# Patient Record
Sex: FEMALE | Employment: FULL TIME | URBAN - METROPOLITAN AREA
[De-identification: names, ages, dates, MRNs, and addresses within clinical notes are randomized per-mention and may not be internally consistent; named-entity substitution may affect disease eponyms.]

---

## 2023-12-14 ENCOUNTER — OCCMED (OUTPATIENT)
Dept: URGENT CARE | Facility: CLINIC | Age: 48
End: 2023-12-14

## 2023-12-14 ENCOUNTER — APPOINTMENT (OUTPATIENT)
Dept: LAB | Facility: CLINIC | Age: 48
End: 2023-12-14

## 2023-12-14 DIAGNOSIS — Z02.1 PRE-EMPLOYMENT EXAMINATION: Primary | ICD-10-CM

## 2023-12-14 DIAGNOSIS — Z02.1 PRE-EMPLOYMENT EXAMINATION: ICD-10-CM

## 2023-12-14 LAB
MEV IGG SER QL IA: NORMAL
MUV IGG SER QL IA: NORMAL
RUBV IGG SERPL IA-ACNC: 118.8 IU/ML
VZV IGG SER QL IA: ABNORMAL

## 2023-12-14 PROCEDURE — 86765 RUBEOLA ANTIBODY: CPT

## 2023-12-14 PROCEDURE — 86762 RUBELLA ANTIBODY: CPT

## 2023-12-14 PROCEDURE — 86787 VARICELLA-ZOSTER ANTIBODY: CPT

## 2023-12-14 PROCEDURE — 86480 TB TEST CELL IMMUN MEASURE: CPT

## 2023-12-14 PROCEDURE — 36415 COLL VENOUS BLD VENIPUNCTURE: CPT

## 2023-12-14 PROCEDURE — 86735 MUMPS ANTIBODY: CPT

## 2023-12-15 LAB
GAMMA INTERFERON BACKGROUND BLD IA-ACNC: 0.14 IU/ML
M TB IFN-G BLD-IMP: NEGATIVE
M TB IFN-G CD4+ BCKGRND COR BLD-ACNC: 0.04 IU/ML
M TB IFN-G CD4+ BCKGRND COR BLD-ACNC: 0.04 IU/ML
MITOGEN IGNF BCKGRD COR BLD-ACNC: 9.86 IU/ML

## 2024-01-17 ENCOUNTER — OFFICE VISIT (OUTPATIENT)
Dept: URGENT CARE | Facility: MEDICAL CENTER | Age: 49
End: 2024-01-17

## 2024-01-17 VITALS
TEMPERATURE: 98.6 F | RESPIRATION RATE: 18 BRPM | HEART RATE: 74 BPM | OXYGEN SATURATION: 98 % | SYSTOLIC BLOOD PRESSURE: 124 MMHG | DIASTOLIC BLOOD PRESSURE: 70 MMHG

## 2024-01-17 DIAGNOSIS — R68.89 FLU-LIKE SYMPTOMS: Primary | ICD-10-CM

## 2024-01-17 LAB
SARS-COV-2 AG UPPER RESP QL IA: NEGATIVE
VALID CONTROL: NORMAL

## 2024-01-17 PROCEDURE — 99213 OFFICE O/P EST LOW 20 MIN: CPT | Performed by: NURSE PRACTITIONER

## 2024-01-17 PROCEDURE — 87811 SARS-COV-2 COVID19 W/OPTIC: CPT | Performed by: NURSE PRACTITIONER

## 2024-01-17 RX ORDER — NAPROXEN 500 MG/1
1 TABLET ORAL 2 TIMES DAILY
COMMUNITY
Start: 2013-03-08

## 2024-01-17 RX ORDER — TRIAMCINOLONE ACETONIDE 1 MG/G
1 OINTMENT TOPICAL 2 TIMES DAILY
COMMUNITY
Start: 2023-09-12 | End: 2024-09-11

## 2024-01-17 RX ORDER — LEVOCETIRIZINE DIHYDROCHLORIDE 5 MG/1
5 TABLET, FILM COATED ORAL
COMMUNITY
Start: 2023-09-13 | End: 2024-09-12

## 2024-01-17 RX ORDER — RIZATRIPTAN BENZOATE 10 MG/1
10 TABLET, ORALLY DISINTEGRATING ORAL
COMMUNITY
Start: 2023-06-08 | End: 2024-06-07

## 2024-01-17 RX ORDER — ERGOCALCIFEROL 1.25 MG/1
1 CAPSULE ORAL WEEKLY
COMMUNITY
Start: 2013-03-19

## 2024-01-17 RX ORDER — MECLIZINE HYDROCHLORIDE 25 MG/1
TABLET ORAL
COMMUNITY
Start: 2013-05-22

## 2024-01-17 RX ORDER — IBUPROFEN 200 MG
200 TABLET ORAL EVERY 6 HOURS PRN
COMMUNITY

## 2024-01-17 RX ORDER — LORATADINE 10 MG/1
1 TABLET ORAL DAILY
COMMUNITY
Start: 2013-05-22

## 2024-01-17 RX ORDER — AMITRIPTYLINE HYDROCHLORIDE 25 MG/1
TABLET, FILM COATED ORAL
COMMUNITY
Start: 2013-03-08

## 2024-01-17 RX ORDER — FLUTICASONE PROPIONATE 50 MCG
2 SPRAY, SUSPENSION (ML) NASAL DAILY
COMMUNITY
Start: 2023-09-13

## 2024-01-17 NOTE — PROGRESS NOTES
NAME: Bella Almaguer is a 48 y.o. female  : 1975    MRN: [id number]    /70   Pulse 74   Temp 98.6 °F (37 °C)   Resp 18   SpO2 98%     11:52 AM    Assessment and Plan   Flu-like symptoms [R68.89]  1. Flu-like symptoms  Poct Covid 19 Rapid Antigen Test          Bella was seen today for fever.    Diagnoses and all orders for this visit:    Flu-like symptoms  -     Poct Covid 19 Rapid Antigen Test        Patient Instructions     Patient Instructions   Take zyrtec or allegra daily  Use flonase 1-2 sprays in each nare daily   Use nasal saline to the nose,   Use humidifer in room  Symptoms worsen go to ER  Rest    Rapid covid test today in the office was done,   Results will be on [employer] nisha.   Check you [employer] for results.     Rest       Proceed to the nearest ER if symptoms worsen, Follow up with your PCP  Continue to social distance, wash your hands, and wear your masks. Please continue to follow the CDC.gov guidelines daily for they are subject to change on COVID-19    Chief Complaint     Chief Complaint   Patient presents with    Fever     Patient c/o fever, body aches, and chills x 5-6 days          History of Present Illness     48-year-old female here today with flulike symptoms have been ongoing for the past 6 days.  She has had headache fever body aches since Thursday and feels that the worst is over.  Her symptoms have been improving has no fever today however had 1 yesterday.  Came in today for because she needs a work note.  She has been taking over-the-counter medications with relief and did not take a COVID test since symptoms started.    Fever  Associated symptoms include chills, congestion, coughing, fatigue, a fever, headaches, myalgias and a sore throat. Pertinent negatives include no chest pain, nausea or vomiting.           Review of Systems   Review of Systems   Constitutional:  Positive for chills, fatigue and fever. Negative for activity change and appetite change.    HENT:  Positive for congestion, ear pain, postnasal drip, rhinorrhea and sore throat. Negative for sinus pressure, sinus pain and sneezing.    Eyes:  Negative for pain.   Respiratory:  Positive for cough. Negative for chest tightness, shortness of breath, wheezing and stridor.    Cardiovascular:  Negative for chest pain.   Gastrointestinal: Negative.  Negative for diarrhea, nausea and vomiting.   Genitourinary: Negative.    Musculoskeletal:  Positive for myalgias.   Skin: Negative.    Neurological:  Positive for headaches.   All other systems reviewed and are negative.        Current Medications       Current Outpatient Medications:     amitriptyline (ELAVIL) 25 mg tablet, Take by mouth, Disp: , Rfl:     fluticasone (FLONASE) 50 mcg/act nasal spray, 2 sprays into each nostril daily, Disp: , Rfl:     levocetirizine (XYZAL) 5 MG tablet, Take 5 mg by mouth, Disp: , Rfl:     loratadine (CLARITIN) 10 mg tablet, Take 1 tablet by mouth daily, Disp: , Rfl:     meclizine (ANTIVERT) 25 mg tablet, Take by mouth, Disp: , Rfl:     naproxen (NAPROSYN) 500 mg tablet, Take 1 tablet by mouth Twice daily, Disp: , Rfl:     rizatriptan (MAXALT-MLT) 10 mg disintegrating tablet, Take 10 mg by mouth, Disp: , Rfl:     triamcinolone (KENALOG) 0.1 % ointment, Apply 1 application. topically 2 (two) times a day, Disp: , Rfl:     Vitamin D, Ergocalciferol, 77193 units CAPS, Take 1 capsule by mouth once a week, Disp: , Rfl:     ACETAMINOPHEN 8 HOUR PO, Take 500 mg by mouth every 6 (six) hours as needed, Disp: , Rfl:     Cholecalciferol 50 MCG (2000 UT) CAPS, Take 1 capsule by mouth every morning, Disp: , Rfl:     ibuprofen (MOTRIN) 200 mg tablet, Take 200 mg by mouth every 6 (six) hours as needed, Disp: , Rfl:     Current Allergies     Allergies as of 01/17/2024    (No Known Allergies)              No past medical history on file.    No past surgical history on file.    No family history on file.      Medications have been verified.    The  "following portions of the patient's history were reviewed and updated as appropriate: allergies, current medications, past family history, past medical history, past social history, past surgical history and problem list.    Objective   /70   Pulse 74   Temp 98.6 °F (37 °C)   Resp 18   SpO2 98%      Physical Exam     Physical Exam  Constitutional:       General: She is not in acute distress.     Appearance: She is well-developed.   HENT:      Head: Normocephalic.      Right Ear: Hearing, tympanic membrane, ear canal and external ear normal. There is no impacted cerumen.      Left Ear: Hearing, tympanic membrane, ear canal and external ear normal. There is no impacted cerumen.      Nose: Mucosal edema present. No congestion or rhinorrhea.      Mouth/Throat:      Mouth: Mucous membranes are moist.      Pharynx: Uvula midline. No oropharyngeal exudate or posterior oropharyngeal erythema.      Tonsils: No tonsillar exudate.   Cardiovascular:      Rate and Rhythm: Normal rate and regular rhythm.      Pulses: Normal pulses.      Heart sounds: Normal heart sounds. No murmur heard.  Pulmonary:      Effort: Pulmonary effort is normal. No respiratory distress.      Breath sounds: Normal breath sounds. No stridor. No decreased breath sounds, wheezing or rhonchi.   Musculoskeletal:         General: No swelling, tenderness, deformity or signs of injury. Normal range of motion.      Cervical back: Normal range of motion. No spinous process tenderness or muscular tenderness. Normal range of motion.      Comments: Full ROM of all extremities and \"soreness\" reported all over   Lymphadenopathy:      Cervical: No cervical adenopathy.   Skin:     General: Skin is warm.      Capillary Refill: Capillary refill takes less than 2 seconds.   Neurological:      Mental Status: She is alert and oriented to person, place, and time.   Psychiatric:         Behavior: Behavior is cooperative.               Note: Portions of this record " "may have been created with voice recognition software. Occasional wrong word or \"sound a like\" substitutions may have occurred due to the inherent limitations of voice recognition software. Please read the chart carefully and recognize, using context, where substitutions have occurred.*    JORGE LUIS Gill  "

## 2024-01-17 NOTE — LETTER
January 17, 2024     Patient: Bella Almaguer  YOB: 1975  Date of Visit: 1/17/2024      To Whom it May Concern:    Bella Almaguer is under my professional care. Bella was seen in my office on 1/17/2024. Bella may return to work on 01/18/2024 as long as fever free. If not please excuse till your next working day.        Sincerely,          JORGE LUIS Gill        CC: No Recipients

## 2024-01-17 NOTE — PATIENT INSTRUCTIONS
Take zyrtec or allegra daily  Use flonase 1-2 sprays in each nare daily   Use nasal saline to the nose,   Use humidifer in room  Symptoms worsen go to ER  Rest    Rapid covid test today in the office was done,   Results will be on [employer] nisha.   Check you [employer] for results.     Rest

## 2024-02-22 ENCOUNTER — OFFICE VISIT (OUTPATIENT)
Dept: FAMILY MEDICINE CLINIC | Facility: CLINIC | Age: 49
End: 2024-02-22
Payer: COMMERCIAL

## 2024-02-22 VITALS
TEMPERATURE: 97 F | BODY MASS INDEX: 31.54 KG/M2 | DIASTOLIC BLOOD PRESSURE: 72 MMHG | OXYGEN SATURATION: 99 % | HEIGHT: 63 IN | SYSTOLIC BLOOD PRESSURE: 118 MMHG | HEART RATE: 82 BPM | WEIGHT: 178 LBS

## 2024-02-22 DIAGNOSIS — G47.30 SLEEP APNEA, UNSPECIFIED TYPE: ICD-10-CM

## 2024-02-22 DIAGNOSIS — Z13.21 ENCOUNTER FOR VITAMIN DEFICIENCY SCREENING: ICD-10-CM

## 2024-02-22 DIAGNOSIS — Z01.419 VISIT FOR GYNECOLOGIC EXAMINATION: ICD-10-CM

## 2024-02-22 DIAGNOSIS — Z12.11 COLON CANCER SCREENING: ICD-10-CM

## 2024-02-22 DIAGNOSIS — M05.79 RHEUMATOID ARTHRITIS INVOLVING MULTIPLE SITES WITH POSITIVE RHEUMATOID FACTOR (HCC): ICD-10-CM

## 2024-02-22 DIAGNOSIS — Z13.220 LIPID SCREENING: ICD-10-CM

## 2024-02-22 DIAGNOSIS — Z13.29 THYROID DISORDER SCREENING: ICD-10-CM

## 2024-02-22 DIAGNOSIS — K20.0 EOSINOPHILIC ESOPHAGITIS: ICD-10-CM

## 2024-02-22 DIAGNOSIS — Z76.89 ENCOUNTER TO ESTABLISH CARE WITH NEW DOCTOR: Primary | ICD-10-CM

## 2024-02-22 DIAGNOSIS — Z13.89 SCREENING FOR GENITOURINARY CONDITION: ICD-10-CM

## 2024-02-22 DIAGNOSIS — Z12.11 SCREENING FOR COLON CANCER: Primary | ICD-10-CM

## 2024-02-22 DIAGNOSIS — Z12.4 CERVICAL CANCER SCREENING: ICD-10-CM

## 2024-02-22 DIAGNOSIS — R10.13 DYSPEPSIA: ICD-10-CM

## 2024-02-22 PROBLEM — H16.223 KERATOCONJUNCTIVITIS SICCA OF BOTH EYES NOT SPECIFIED AS SJOGREN'S: Status: ACTIVE | Noted: 2022-09-01

## 2024-02-22 PROBLEM — G47.33 MODERATE OBSTRUCTIVE SLEEP APNEA: Status: ACTIVE | Noted: 2017-11-09

## 2024-02-22 PROBLEM — F32.1 MODERATE SINGLE CURRENT EPISODE OF MAJOR DEPRESSIVE DISORDER (HCC): Chronic | Status: ACTIVE | Noted: 2018-08-03

## 2024-02-22 PROBLEM — J45.20 MILD INTERMITTENT ASTHMA WITHOUT COMPLICATION: Status: ACTIVE | Noted: 2024-02-22

## 2024-02-22 PROCEDURE — 99205 OFFICE O/P NEW HI 60 MIN: CPT | Performed by: INTERNAL MEDICINE

## 2024-02-22 RX ORDER — CYCLOSPORINE 0.5 MG/ML
1 EMULSION OPHTHALMIC 2 TIMES DAILY
COMMUNITY

## 2024-02-22 RX ORDER — ALBUTEROL SULFATE 90 UG/1
2 AEROSOL, METERED RESPIRATORY (INHALATION) EVERY 6 HOURS PRN
COMMUNITY

## 2024-02-22 RX ORDER — TOFACITINIB 11 MG/1
11 TABLET, FILM COATED, EXTENDED RELEASE ORAL DAILY
Qty: 60 TABLET | Refills: 0 | Status: SHIPPED | OUTPATIENT
Start: 2024-02-22

## 2024-02-22 RX ORDER — TOFACITINIB 11 MG/1
11 TABLET, FILM COATED, EXTENDED RELEASE ORAL DAILY
COMMUNITY
End: 2024-02-22 | Stop reason: SDUPTHER

## 2024-02-22 NOTE — PROGRESS NOTES
Assessment/Plan:             Problem List Items Addressed This Visit    None  Visit Diagnoses       Encounter to establish care with new doctor    -  Primary    Rheumatoid arthritis involving multiple sites with positive rheumatoid factor (HCC)        Relevant Medications    Tofacitinib Citrate ER (Xeljanz XR) 11 MG TB24    Other Relevant Orders    Ambulatory Referral to Rheumatology    Comprehensive metabolic panel    CBC and differential    Eosinophilic esophagitis        Dyspepsia        Relevant Orders    Ambulatory Referral to Gastroenterology    Sleep apnea, unspecified type        Relevant Orders    Ambulatory Referral to Sleep Medicine    Visit for gynecologic examination        Relevant Orders    Ambulatory Referral to Obstetrics / Gynecology    Colon cancer screening        Relevant Orders    Ambulatory Referral to Gastroenterology    Lipid screening        Relevant Orders    Lipid panel    Thyroid disorder screening        Relevant Orders    TSH, 3rd generation with Free T4 reflex    Screening for genitourinary condition        Relevant Orders    Urinalysis with microscopic    Encounter for vitamin deficiency screening        Relevant Orders    Vitamin D 25 hydroxy              Subjective:      Patient ID: Bella Almaguer is a 48 y.o. female.    HPI    This is a pleasant patient here to establish care.  She has recently joined [employer] department.  She was a long-term patient of [facility] and will be switching oral specialist or to [facility].  She has a history of Rheumatoid arthritis/Sjogren's on Xeljanz was under care of [facility] rheumatology and will be switched over to [facility].    Patient complains of headaches.  Patient had an MRI 2022 that showed subcentimeter white matter lesions with no significant change from other MRIs in the past.  I suspect this to be secondary to untreated sleep apnea.  Referral to sleep study.  Lung function shows slightly decreased DLCO.   "Echocardiogram 2021 did not show any concern of increase pulmonary artery pressure.    MRI scan shows moderate osteoarthritis  CT neck did not show any acute abnormality 321  Last mammogram 823.  Patient had an EGD last year that supported eosinophilic esophagitis.  Patient had gastric emptying study that showed mildly delayed gastric emptying 2022.  GI referral.  She is currently taking PPI H2 blocker without improvement of her symptoms.  We did discuss starting inhaled steroids to help with her symptoms.  GI referral.  Due for routine lab studies.   The following portions of the patient's history were reviewed and updated as appropriate: allergies, current medications, past family history, past medical history, past social history, past surgical history, and problem list.    Review of Systems      Objective:      /72 (BP Location: Left arm, Patient Position: Sitting, Cuff Size: Standard)   Pulse 82   Temp (!) 97 °F (36.1 °C) (Temporal)   Ht 5' 2.5\" (1.588 m)   Wt 80.7 kg (178 lb)   SpO2 99%   BMI 32.04 kg/m²          Physical Exam  Constitutional:       General: She is not in acute distress.     Appearance: She is well-developed.   HENT:      Head: Normocephalic.      Mouth/Throat:      Pharynx: No oropharyngeal exudate.   Eyes:      General: No scleral icterus.     Conjunctiva/sclera: Conjunctivae normal.   Neck:      Thyroid: No thyromegaly.   Cardiovascular:      Rate and Rhythm: Normal rate and regular rhythm.      Heart sounds: Normal heart sounds. No murmur heard.  Pulmonary:      Effort: Pulmonary effort is normal. No respiratory distress.      Breath sounds: Normal breath sounds. No wheezing or rales.   Abdominal:      General: Bowel sounds are normal. There is no distension.      Palpations: Abdomen is soft.      Tenderness: There is no abdominal tenderness. There is no right CVA tenderness, left CVA tenderness, guarding or rebound.   Musculoskeletal:         General: Swelling (Mild puffiness " of the hands) present.      Right lower leg: No edema.      Left lower leg: No edema.   Lymphadenopathy:      Cervical: No cervical adenopathy.   Skin:     General: Skin is warm.   Neurological:      Mental Status: She is alert and oriented to person, place, and time.      Cranial Nerves: No cranial nerve deficit.      Deep Tendon Reflexes: Reflexes are normal and symmetric.   Psychiatric:         Mood and Affect: Mood normal.         Behavior: Behavior normal.         Thought Content: Thought content normal.         Judgment: Judgment normal.

## 2024-03-06 ENCOUNTER — APPOINTMENT (OUTPATIENT)
Dept: LAB | Facility: CLINIC | Age: 49
End: 2024-03-06
Payer: COMMERCIAL

## 2024-03-06 DIAGNOSIS — Z13.89 SCREENING FOR GENITOURINARY CONDITION: ICD-10-CM

## 2024-03-06 LAB
25(OH)D3 SERPL-MCNC: 18 NG/ML (ref 30–100)
ALBUMIN SERPL BCP-MCNC: 4.1 G/DL (ref 3.5–5)
ALP SERPL-CCNC: 79 U/L (ref 34–104)
ALT SERPL W P-5'-P-CCNC: 30 U/L (ref 7–52)
ANION GAP SERPL CALCULATED.3IONS-SCNC: 10 MMOL/L
AST SERPL W P-5'-P-CCNC: 26 U/L (ref 13–39)
BACTERIA UR QL AUTO: ABNORMAL /HPF
BASOPHILS # BLD AUTO: 0.06 THOUSANDS/ÂΜL (ref 0–0.1)
BASOPHILS NFR BLD AUTO: 1 % (ref 0–1)
BILIRUB SERPL-MCNC: 0.61 MG/DL (ref 0.2–1)
BILIRUB UR QL STRIP: NEGATIVE
BUN SERPL-MCNC: 11 MG/DL (ref 5–25)
CALCIUM SERPL-MCNC: 8.7 MG/DL (ref 8.4–10.2)
CHLORIDE SERPL-SCNC: 105 MMOL/L (ref 96–108)
CHOLEST SERPL-MCNC: 244 MG/DL
CLARITY UR: CLEAR
CO2 SERPL-SCNC: 27 MMOL/L (ref 21–32)
COLOR UR: ABNORMAL
CREAT SERPL-MCNC: 0.72 MG/DL (ref 0.6–1.3)
EOSINOPHIL # BLD AUTO: 0.28 THOUSAND/ÂΜL (ref 0–0.61)
EOSINOPHIL NFR BLD AUTO: 5 % (ref 0–6)
ERYTHROCYTE [DISTWIDTH] IN BLOOD BY AUTOMATED COUNT: 13.3 % (ref 11.6–15.1)
GFR SERPL CREATININE-BSD FRML MDRD: 98 ML/MIN/1.73SQ M
GLUCOSE P FAST SERPL-MCNC: 94 MG/DL (ref 65–99)
GLUCOSE UR STRIP-MCNC: NEGATIVE MG/DL
HCT VFR BLD AUTO: 35.1 % (ref 34.8–46.1)
HDLC SERPL-MCNC: 59 MG/DL
HGB BLD-MCNC: 12.1 G/DL (ref 11.5–15.4)
HGB UR QL STRIP.AUTO: NEGATIVE
IMM GRANULOCYTES # BLD AUTO: 0.02 THOUSAND/UL (ref 0–0.2)
IMM GRANULOCYTES NFR BLD AUTO: 0 % (ref 0–2)
KETONES UR STRIP-MCNC: NEGATIVE MG/DL
LDLC SERPL CALC-MCNC: 136 MG/DL (ref 0–100)
LEUKOCYTE ESTERASE UR QL STRIP: NEGATIVE
LYMPHOCYTES # BLD AUTO: 2.24 THOUSANDS/ÂΜL (ref 0.6–4.47)
LYMPHOCYTES NFR BLD AUTO: 37 % (ref 14–44)
MCH RBC QN AUTO: 29.1 PG (ref 26.8–34.3)
MCHC RBC AUTO-ENTMCNC: 34.5 G/DL (ref 31.4–37.4)
MCV RBC AUTO: 84 FL (ref 82–98)
MONOCYTES # BLD AUTO: 0.49 THOUSAND/ÂΜL (ref 0.17–1.22)
MONOCYTES NFR BLD AUTO: 8 % (ref 4–12)
NEUTROPHILS # BLD AUTO: 2.98 THOUSANDS/ÂΜL (ref 1.85–7.62)
NEUTS SEG NFR BLD AUTO: 49 % (ref 43–75)
NITRITE UR QL STRIP: NEGATIVE
NON-SQ EPI CELLS URNS QL MICRO: ABNORMAL /HPF
NONHDLC SERPL-MCNC: 185 MG/DL
NRBC BLD AUTO-RTO: 0 /100 WBCS
PH UR STRIP.AUTO: 7.5 [PH]
PLATELET # BLD AUTO: 216 THOUSANDS/UL (ref 149–390)
PMV BLD AUTO: 11.9 FL (ref 8.9–12.7)
POTASSIUM SERPL-SCNC: 3.9 MMOL/L (ref 3.5–5.3)
PROT SERPL-MCNC: 7.1 G/DL (ref 6.4–8.4)
PROT UR STRIP-MCNC: ABNORMAL MG/DL
RBC # BLD AUTO: 4.16 MILLION/UL (ref 3.81–5.12)
RBC #/AREA URNS AUTO: ABNORMAL /HPF
SODIUM SERPL-SCNC: 142 MMOL/L (ref 135–147)
SP GR UR STRIP.AUTO: 1.01 (ref 1–1.03)
TRIGL SERPL-MCNC: 243 MG/DL
TSH SERPL DL<=0.05 MIU/L-ACNC: 3.29 UIU/ML (ref 0.45–4.5)
UROBILINOGEN UR STRIP-ACNC: <2 MG/DL
WBC # BLD AUTO: 6.07 THOUSAND/UL (ref 4.31–10.16)
WBC #/AREA URNS AUTO: ABNORMAL /HPF

## 2024-03-06 PROCEDURE — 81001 URINALYSIS AUTO W/SCOPE: CPT

## 2024-03-07 ENCOUNTER — OFFICE VISIT (OUTPATIENT)
Dept: FAMILY MEDICINE CLINIC | Facility: CLINIC | Age: 49
End: 2024-03-07
Payer: COMMERCIAL

## 2024-03-07 VITALS
HEART RATE: 73 BPM | OXYGEN SATURATION: 99 % | HEIGHT: 62 IN | SYSTOLIC BLOOD PRESSURE: 172 MMHG | DIASTOLIC BLOOD PRESSURE: 120 MMHG | TEMPERATURE: 98 F | WEIGHT: 179 LBS | BODY MASS INDEX: 32.94 KG/M2 | RESPIRATION RATE: 18 BRPM

## 2024-03-07 DIAGNOSIS — M05.79 RHEUMATOID ARTHRITIS INVOLVING MULTIPLE SITES WITH POSITIVE RHEUMATOID FACTOR (HCC): ICD-10-CM

## 2024-03-07 DIAGNOSIS — E78.5 HYPERLIPIDEMIA, UNSPECIFIED HYPERLIPIDEMIA TYPE: ICD-10-CM

## 2024-03-07 DIAGNOSIS — Z00.00 ANNUAL PHYSICAL EXAM: Primary | ICD-10-CM

## 2024-03-07 DIAGNOSIS — I10 ACCELERATED HYPERTENSION: ICD-10-CM

## 2024-03-07 DIAGNOSIS — E55.9 VITAMIN D DEFICIENCY: ICD-10-CM

## 2024-03-07 PROCEDURE — 99396 PREV VISIT EST AGE 40-64: CPT | Performed by: INTERNAL MEDICINE

## 2024-03-07 RX ORDER — ERGOCALCIFEROL 1.25 MG/1
50000 CAPSULE ORAL WEEKLY
Qty: 8 CAPSULE | Refills: 0 | Status: SHIPPED | OUTPATIENT
Start: 2024-03-07

## 2024-03-07 NOTE — PROGRESS NOTES
ADULT ANNUAL PHYSICAL  [facility] [address] [facility]IMARY CARE [facility]    NAME: Bella Almaguer  AGE: 48 y.o. SEX: female  : 1975     DATE: 3/7/2024     Assessment and Plan:     Problem List Items Addressed This Visit    None  Visit Diagnoses       Annual physical exam    -  Primary    Accelerated hypertension        Rheumatoid arthritis involving multiple sites with positive rheumatoid factor (HCC)        Hyperlipidemia, unspecified hyperlipidemia type        Vitamin D deficiency        Relevant Medications    ergocalciferol (VITAMIN D2) 50,000 units              Immunizations and preventive care screenings were discussed with patient today. Appropriate education was printed on patient's after visit summary.    Counseling:  As below         No follow-ups on file.     Chief Complaint:     Chief Complaint   Patient presents with    Physical Exam    Nausea     Started 4 day ago and its still occurring     Dizziness      History of Present Illness:     Adult Annual Physical   Patient here for a comprehensive physical exam. The patient reports problems - as below .  Patient is here to follow-up for annual physical and recent lab studies.  Patient has been feeling nauseous headachy not feeling herself.  Her blood pressure is quite elevated.  I rechecked her blood pressure myself and it improved from 1 70-1  1 60.  She denies any chest pain or shortness of breath.  Patient has been on Xeljanz X for 7 years for rheumatoid arthritis.  I would advise her to stop the medication.  I will recheck blood pressure next week.  If it remains elevated we will start a medication.  Encouraged her to watch her salt intake.  She is waiting for appointment with new rheumatologist.  Vitamin D was found to be quite low prescription will be called in.  Cholesterol is on the higher side I do want patient repeat blood work in 6 to 8 weeks   patient has been referred to GI for colonoscopy.      Diet and Physical Activity  Diet/Nutrition:  Regular .   Exercise: no formal exercise.      Depression Screening  PHQ-2/9 Depression Screening    Little interest or pleasure in doing things: 0 - not at all  Feeling down, depressed, or hopeless: 2 - more than half the days  Trouble falling or staying asleep, or sleeping too much: 1 - several days  Feeling tired or having little energy: 3 - nearly every day  Poor appetite or overeatin - more than half the days  Feeling bad about yourself - or that you are a failure or have let yourself or your family down: 0 - not at all  Trouble concentrating on things, such as reading the newspaper or watching television: 2 - more than half the days  Moving or speaking so slowly that other people could have noticed. Or the opposite - being so fidgety or restless that you have been moving around a lot more than usual: 0 - not at all  Thoughts that you would be better off dead, or of hurting yourself in some way: 0 - not at all       General Health  Sleep: gets 7-8 hours of sleep on average.   Hearing: normal - bilateral.  Vision: no vision problems.   Dental: brushes teeth twice daily.       /GYN Health  Follows with gynecology? yes   Patient is: perimenopausal  Last menstrual period: Irregular  Contraceptive method:  None .    Advanced Care Planning  Do you have an advanced directive? no  Do you have a durable medical power of ? yes  ACP document given to the patient? no     Review of Systems:   Perimenopausal  Review of Systems   Past Medical History:     History reviewed. No pertinent past medical history.   Past Surgical History:     History reviewed. No pertinent surgical history.   Social History:     Social History     Socioeconomic History    Marital status: Unknown     Spouse name: None    Number of children: None    Years of education: None    Highest education level: None   Occupational History    None   Tobacco Use    Smoking status: Never     Passive  exposure: Never    Smokeless tobacco: Never   Vaping Use    Vaping status: Never Used   Substance and Sexual Activity    Alcohol use: Not Currently    Drug use: Not Currently    Sexual activity: None   Other Topics Concern    None   Social History Narrative    None     Social Determinants of Health     Financial Resource Strain: Not on file   Food Insecurity: Not on file   Transportation Needs: Not on file   Physical Activity: Not on file   Stress: Not on file   Social Connections: Not on file   Intimate Partner Violence: Not on file   Housing Stability: Not on file      Family History:     History reviewed. No pertinent family history.   Current Medications:     Current Outpatient Medications   Medication Sig Dispense Refill    ACETAMINOPHEN 8 HOUR PO Take 500 mg by mouth every 6 (six) hours as needed      albuterol (PROVENTIL HFA,VENTOLIN HFA) 90 mcg/act inhaler Inhale 2 puffs every 6 (six) hours as needed for wheezing      amitriptyline (ELAVIL) 25 mg tablet Take by mouth      Cholecalciferol 50 MCG (2000 UT) CAPS Take 1 capsule by mouth every morning      cycloSPORINE (Restasis) 0.05 % ophthalmic emulsion Administer 1 drop to both eyes 2 (two) times a day      ergocalciferol (VITAMIN D2) 50,000 units Take 1 capsule (50,000 Units total) by mouth once a week 8 capsule 0    fluticasone (FLONASE) 50 mcg/act nasal spray 2 sprays into each nostril daily      ibuprofen (MOTRIN) 200 mg tablet Take 200 mg by mouth every 6 (six) hours as needed      levocetirizine (XYZAL) 5 MG tablet Take 5 mg by mouth      loratadine (CLARITIN) 10 mg tablet Take 1 tablet by mouth daily      naproxen (NAPROSYN) 500 mg tablet Take 1 tablet by mouth Twice daily      triamcinolone (KENALOG) 0.1 % ointment Apply 1 application. topically 2 (two) times a day      Vitamin D, Ergocalciferol, 66621 units CAPS Take 1 capsule by mouth once a week      meclizine (ANTIVERT) 25 mg tablet Take by mouth (Patient not taking: Reported on 2/22/2024)       "rizatriptan (MAXALT-MLT) 10 mg disintegrating tablet Take 10 mg by mouth (Patient not taking: Reported on 2/22/2024)       No current facility-administered medications for this visit.      Allergies:     Allergies   Allergen Reactions    Pollen Extract Facial Swelling    Avocado - Food Allergy Hives    Grapeseed Extract [Nutritional Supplements] Hives    Lactose - Food Allergy Diarrhea      Physical Exam:     BP (!) 172/120 (BP Location: Left arm, Patient Position: Sitting, Cuff Size: Standard)   Pulse 73   Temp 98 °F (36.7 °C) (Tympanic)   Resp 18   Ht 5' 2\" (1.575 m)   Wt 81.2 kg (179 lb)   SpO2 99%   BMI 32.74 kg/m²     Physical Exam  Constitutional:       General: She is not in acute distress.     Appearance: She is not diaphoretic.   Eyes:      General: No scleral icterus.     Conjunctiva/sclera: Conjunctivae normal.   Neck:      Thyroid: No thyromegaly.      Vascular: No carotid bruit.   Cardiovascular:      Rate and Rhythm: Normal rate and regular rhythm.      Heart sounds: Normal heart sounds. No murmur heard.  Pulmonary:      Effort: Pulmonary effort is normal. No respiratory distress.      Breath sounds: Normal breath sounds. No stridor. No wheezing or rales.   Abdominal:      General: Bowel sounds are normal. There is no distension.      Palpations: Abdomen is soft. There is no mass.      Tenderness: There is no abdominal tenderness. There is no guarding.   Musculoskeletal:      Right lower leg: No edema.      Left lower leg: No edema.   Lymphadenopathy:      Cervical: No cervical adenopathy.   Skin:     General: Skin is warm.      Coloration: Skin is not pale.   Neurological:      Mental Status: She is alert and oriented to person, place, and time.      Cranial Nerves: No cranial nerve deficit.      Coordination: Coordination normal.   Psychiatric:         Mood and Affect: Mood normal.         Behavior: Behavior normal.          MD SANTOSH [facility]"

## 2024-03-08 ENCOUNTER — TELEPHONE (OUTPATIENT)
Dept: RHEUMATOLOGY | Facility: CLINIC | Age: 49
End: 2024-03-08

## 2024-03-08 NOTE — TELEPHONE ENCOUNTER
----- Message from Marcel Looney MD sent at 3/7/2024  9:00 PM EST -----  Can you add this patient onto my scheduled for noon on Friday, 3/22nd or 3/29th?    ----- Message -----  From: Marilia Perez MD  Sent: 3/7/2024   3:46 PM EST  To: MD Kyler Arcos,  Hope all is well with you.  This patient has seropositive rheumatoid arthritis for several years and has been maintained on Xeljanz managed by [facility] rheumatology but had to switch to [facility] because of employment.  She has an appointment with rheumatology in May however today her blood pressure is 170.  I had to stop her Xeljanz and will be seeing her next week.  I was wondering if you be able to accommodate her for sooner appointment.   Thank you very much,  Marilia

## 2024-03-20 ENCOUNTER — TELEPHONE (OUTPATIENT)
Dept: RHEUMATOLOGY | Facility: CLINIC | Age: 49
End: 2024-03-20

## 2024-03-20 NOTE — TELEPHONE ENCOUNTER
Left voicemail for patient in regards to provider to being available in the morning and seeing patients in the evening. Offered the patient the 12:30 on Friday the 22nd and offered the 1:45 on Wednesday the 20th at [facility].

## 2024-03-21 ENCOUNTER — TELEPHONE (OUTPATIENT)
Dept: RHEUMATOLOGY | Facility: CLINIC | Age: 49
End: 2024-03-21

## 2024-03-21 NOTE — TELEPHONE ENCOUNTER
Reached out to patient and was able to speak with them about the provider not being available in the afternoon patient is okay with being seen at 12:30 instead of 12 at the same location and date and time

## 2024-03-22 ENCOUNTER — OFFICE VISIT (OUTPATIENT)
Dept: FAMILY MEDICINE CLINIC | Facility: CLINIC | Age: 49
End: 2024-03-22
Payer: COMMERCIAL

## 2024-03-22 ENCOUNTER — CONSULT (OUTPATIENT)
Dept: RHEUMATOLOGY | Facility: CLINIC | Age: 49
End: 2024-03-22
Payer: COMMERCIAL

## 2024-03-22 VITALS
OXYGEN SATURATION: 96 % | WEIGHT: 181 LBS | SYSTOLIC BLOOD PRESSURE: 120 MMHG | DIASTOLIC BLOOD PRESSURE: 86 MMHG | HEART RATE: 80 BPM | BODY MASS INDEX: 30.12 KG/M2 | TEMPERATURE: 97.5 F

## 2024-03-22 VITALS
BODY MASS INDEX: 30.49 KG/M2 | HEIGHT: 65 IN | HEART RATE: 91 BPM | WEIGHT: 183 LBS | DIASTOLIC BLOOD PRESSURE: 72 MMHG | SYSTOLIC BLOOD PRESSURE: 130 MMHG | OXYGEN SATURATION: 97 %

## 2024-03-22 DIAGNOSIS — R53.83 OTHER FATIGUE: Primary | ICD-10-CM

## 2024-03-22 DIAGNOSIS — Z12.4 CERVICAL CANCER SCREENING: ICD-10-CM

## 2024-03-22 DIAGNOSIS — Z79.899 HIGH RISK MEDICATION USE: ICD-10-CM

## 2024-03-22 DIAGNOSIS — H04.123 DRY EYES: ICD-10-CM

## 2024-03-22 DIAGNOSIS — M35.00 SICCA, UNSPECIFIED TYPE (HCC): ICD-10-CM

## 2024-03-22 DIAGNOSIS — E78.5 HYPERLIPIDEMIA, UNSPECIFIED HYPERLIPIDEMIA TYPE: ICD-10-CM

## 2024-03-22 DIAGNOSIS — Z01.30 BLOOD PRESSURE CHECK: ICD-10-CM

## 2024-03-22 DIAGNOSIS — M05.79 RHEUMATOID ARTHRITIS INVOLVING MULTIPLE SITES WITH POSITIVE RHEUMATOID FACTOR (HCC): Primary | ICD-10-CM

## 2024-03-22 PROCEDURE — 99214 OFFICE O/P EST MOD 30 MIN: CPT | Performed by: INTERNAL MEDICINE

## 2024-03-22 PROCEDURE — 99245 OFF/OP CONSLTJ NEW/EST HI 55: CPT | Performed by: INTERNAL MEDICINE

## 2024-03-22 RX ORDER — DICLOFENAC SODIUM 75 MG/1
75 TABLET, DELAYED RELEASE ORAL 2 TIMES DAILY
Qty: 60 TABLET | Refills: 6 | Status: SHIPPED | OUTPATIENT
Start: 2024-03-22

## 2024-03-22 RX ORDER — LEFLUNOMIDE 10 MG/1
10 TABLET ORAL DAILY
Qty: 30 TABLET | Refills: 6 | Status: SHIPPED | OUTPATIENT
Start: 2024-03-22

## 2024-03-22 NOTE — PATIENT INSTRUCTIONS
Start leflunomide 10mg daily  Can take diclofenac twice a day as needed for joint pain, take with food  Do labs now, then every 3 months  Will consider adding on Rinvoq in future if leflunomide is not enough to control the RA  Ophthalmology referral made for dry eyes  Do plantar fasciitis exercises    Return to clinic in 4 months

## 2024-03-22 NOTE — PROGRESS NOTES
Assessment and Plan:  Bella Almaguer is a 48 y.o.  female who presents as a Rheumatology consult referred by Marilia Perez MD to establish care for her seropositive Rheumatoid arthritis. Used to follow at [facility], and her disease was under control on Xeljanz. However, this was discontinued by her PCP more recently due to it possibly causing elevated BP. Patient's RA symptoms are becoming more active off treatment. Patient's rheumatologic disease(s) threaten long-term function if not appropriately treated.    Start leflunomide 10mg daily  Can take diclofenac twice a day as needed for joint pain, take with food  Do labs now, then every 3 months  Will consider adding on Rinvoq in future if leflunomide is not enough to control the RA  Ophthalmology referral made for dry eyes  Do plantar fasciitis exercises    Return to clinic in 4 months    Plan:  1. Rheumatoid arthritis involving multiple sites with positive rheumatoid factor (HCC)  -     Ambulatory Referral to Rheumatology  -     CBC and differential  -     Comprehensive metabolic panel  -     Sedimentation rate, automated  -     C-reactive protein  -     Chronic Hepatitis Panel  -     leflunomide (ARAVA) 10 MG tablet; Take 1 tablet (10 mg total) by mouth daily  -     CBC and differential; Standing  -     Comprehensive metabolic panel; Standing  -     C-reactive protein; Standing  -     Sedimentation rate, automated; Standing  -     diclofenac (VOLTAREN) 75 mg EC tablet; Take 1 tablet (75 mg total) by mouth 2 (two) times a day As needed for joint pain, take with food    2. Dry eyes  -     Ambulatory Referral to Ophthalmology; Future    3. Sicca, unspecified type (HCC)  -     Ambulatory Referral to Ophthalmology; Future    4. High risk medication use  -     CBC and differential  -     Comprehensive metabolic panel  -     Chronic Hepatitis Panel  -     CBC and differential; Standing  -     Comprehensive metabolic panel; Standing    High risk medication use - Benefits and  risks of leflunomide use, including but not limited to gastrointestinal disturbances such as nausea, diarrhea, stomatitis, hair loss, fatigue, leukopenia, hepatotoxicity were discussed with the patient.  CBC, CMP will be monitored regularly.    RTC in 4 months      Chief Complaint  Establish care for seropositive RA    HPI  Bella Almaguer is a 48 y.o.  female who presents as a Rheumatology consult referred by Marilia Perez MD. She is here for new patient evaluation. She has a history of seropositive rheumatoid arthritis for several years. She consents to the use of YANCY.    The patient made some changes to her lifestyle upon learning of her diagnosis with rheumatoid arthritis, she was familiar with the condition, yet she was unprepared for its impact. She adjusted her diet, adopting an anti-inflammatory regimen, and engaged in various health-promoting activities to mitigate her symptoms. She reduced meat consumption and controlled her food intake, avoiding sugary foods.  Despite these efforts, she experienced flare-ups and daily pain throughout her body, with her hands and feet frequently swelling. She took methotrexate, which brought her symptoms closer to her baseline. However, a subsequent flare-up persisted for an extended period. She had been on Enbrel for several years, which she believed initially provided relief, though her symptoms had persisted for a considerable time. She was on Xeljanz, but she did not tolerate it well due to feeling fatigue after taking it, and it also increased her cholesterol levels. Additionally, she noted that her vitamin D levels were low. She was under the management of [facility] for her treatment with Xeljanz. However, her appointment in 05/2023 revealed elevated blood pressure, prompting her doctor to discontinue Xeljanz for 2 weeks.    Currently, she continues to experience fatigue and daily pain, with morning stiffness and pain persisting for 2 hours,  necessitating an early morning wake-up to function. Her symptoms, which include pain in her arms, legs, neck, knuckles, and the left fifth finger, have been ongoing, even before her recent discontinuation of Xeljanz. She does not perceive her arthritis symptoms as significantly worsening in the past 2 weeks. Additionally, she suffers from plantar fasciitis and heel pain, with morning back and neck pain upon standing. Her blood pressure reading today was 130/72 mmHg, a condition that had previously led to high blood pressure while on Xeljanz several years ago. She also experienced a decrease in her breathing while on Xeljanz. Following recovery, she went to a pulmonologist due to feeling fatigue, weakness, and difficulty breathing following a movement. She was tested and was placed in oxygen for 2 to 3 months. The pulmonologist also advised that she discontinue methotrexate. She consulted her rheumatologist in 02/2019 who also agreed upon the discontinuation of methotrexate. They stopped methotrexate and other medications. She continued her treatment with the pulmonologist and after a few months they removed her oxygen. She has asthma but it was controlled. Subsequently, she resumed Xeljanz, which seemed to alleviate her symptoms. However, she experienced a hypertension episode in 2023, prompting her healthcare provider to recommend Orencia injections. Despite the recommendation, she expressed a preference against infusions or injections. She had undergone hand X-rays the previous year.    She had been experiencing severe dryness in her eyes, which led her to an ophthalmologist. The diagnosis confirmed dryness, and she was prescribed Restasis. Her condition has not been formally evaluated for Sjogren's syndrome.    She consulted with Dr. Perez in 01/2024 or 02/2024, who advised against continuing Xeljanz and consulted with a rheumatologist to request a temporary halt to its use. As part of her treatment, Dr. Perez  prescribed her 8 pills of vitamin D weekly, followed by over-the-counter vitamin D 2000 International units, taken daily after an 8-week period.    Having been on Enbrel for approximately 7 years, she discontinued its use due to persistent leg pain, darkening of her belly and skin, and a multitude of lifestyle changes. She relies on ibuprofen and naproxen for joint pain, though naproxen has not been effective in the past. Tylenol is her go-to for tolerable pain. She did not take leflunomide and was on prednisone last year. She was on Plaquenil in 2013 or 2014, which led to a period of illness and pain. She then switched to Medrol. Currently, she does not take medications for hypertension or joint pain.    She initially sought medical attention without a clear diagnosis, leading to a presumption of anemia and vitamin D deficiency based on her symptoms. Upon receiving vitamin supplements, she was sent home. It was only after her previous PCP informed her of her arthritis diagnosis that she was referred to a specialist for further evaluation.    Denies photosensitivity, rashes, psoriasis, sicca symptoms, oral or nasal ulcers, alopecia, Raynaud's, h/o pericarditis or pleurisy, h/o blood clots or miscarriages.    [ No known family history of autoimmune or inflammatory diseases.]    Occupation: She works as a financial counselor.        Review of Systems  Pertinent ROS positive for fatigue, headaches, blurry vision, dry eyes, sinus problems, mouth sores, shortness of breath, nausea, joint pain, joint swelling, back pain, neck pain, muscle pain, dizziness, cold intolerance, and leg swelling. Rest of 14 point ROS reviewed and were negative.    Allergies  Allergies   Allergen Reactions   • Pollen Extract Facial Swelling   • Avocado - Food Allergy Hives   • Grapeseed Extract [Nutritional Supplements] Hives   • Lactose - Food Allergy Diarrhea       Home Medications    Current Outpatient Medications:   •  ACETAMINOPHEN 8 HOUR  PO, Take 500 mg by mouth every 6 (six) hours as needed, Disp: , Rfl:   •  albuterol (PROVENTIL HFA,VENTOLIN HFA) 90 mcg/act inhaler, Inhale 2 puffs every 6 (six) hours as needed for wheezing, Disp: , Rfl:   •  amitriptyline (ELAVIL) 25 mg tablet, Take by mouth, Disp: , Rfl:   •  Cholecalciferol 50 MCG (2000 UT) CAPS, Take 1 capsule by mouth every morning, Disp: , Rfl:   •  cycloSPORINE (Restasis) 0.05 % ophthalmic emulsion, Administer 1 drop to both eyes 2 (two) times a day, Disp: , Rfl:   •  diclofenac (VOLTAREN) 75 mg EC tablet, Take 1 tablet (75 mg total) by mouth 2 (two) times a day As needed for joint pain, take with food, Disp: 60 tablet, Rfl: 6  •  ergocalciferol (VITAMIN D2) 50,000 units, Take 1 capsule (50,000 Units total) by mouth once a week, Disp: 8 capsule, Rfl: 0  •  fluticasone (FLONASE) 50 mcg/act nasal spray, 2 sprays into each nostril daily, Disp: , Rfl:   •  ibuprofen (MOTRIN) 200 mg tablet, Take 200 mg by mouth every 6 (six) hours as needed, Disp: , Rfl:   •  leflunomide (ARAVA) 10 MG tablet, Take 1 tablet (10 mg total) by mouth daily, Disp: 30 tablet, Rfl: 6  •  levocetirizine (XYZAL) 5 MG tablet, Take 5 mg by mouth, Disp: , Rfl:   •  loratadine (CLARITIN) 10 mg tablet, Take 1 tablet by mouth daily, Disp: , Rfl:   •  MAGNESIUM CITRATE PO, Take 400 mg by mouth Takes once daily, Disp: , Rfl:   •  triamcinolone (KENALOG) 0.1 % ointment, Apply 1 application. topically 2 (two) times a day, Disp: , Rfl:   •  Vitamin D, Ergocalciferol, 13372 units CAPS, Take 1 capsule by mouth once a week, Disp: , Rfl:   •  meclizine (ANTIVERT) 25 mg tablet, Take by mouth, Disp: , Rfl:   •  rizatriptan (MAXALT-MLT) 10 mg disintegrating tablet, Take 10 mg by mouth (Patient not taking: Reported on 2/22/2024), Disp: , Rfl:     Past Medical History  History reviewed. No pertinent past medical history.    Past Surgical History   History reviewed. No pertinent surgical history.    Family History  History reviewed. No  "pertinent family history.    Social History  Social History     Substance and Sexual Activity   Alcohol Use Not Currently     Social History     Substance and Sexual Activity   Drug Use Not Currently     Social History     Tobacco Use   Smoking Status Never   • Passive exposure: Never   Smokeless Tobacco Never       Objective:  Vitals:    03/22/24 1239   BP: 130/72   Pulse: 91   SpO2: 97%   Weight: 83 kg (183 lb)   Height: 5' 5\" (1.651 m)       Physical Exam:  PHYSICAL EXAM:  Constitutional:    General: She is not in acute distress.  HENT:   Head: Normocephalic and atraumatic.   Eyes:   Conjunctiva/sclera: Conjunctivae normal.   Cardiovascular:   Rate and Rhythm: Normal rate and regular rhythm.   Heart sounds: S1 normal and S2 normal.   No friction rub.   Pulmonary:   Effort: Pulmonary effort is normal. No respiratory distress.   Breath sounds: Normal breath sounds. No wheezing, rhonchi or rales.   Musculoskeletal:   Right wrist tenderness. Right second and third MCP swelling. Right fourth PIP tenderness. Left second and third MCP swelling and tenderness. Overall diffuse bilateral MCP and PIP tenderness. Left ankle tenderness.  Cervical back: Neck supple.   Skin:  General: Skin is warm and dry.   Coloration: Skin is not pale.   Findings: No rash.   Neurological:   Mental Status: She is alert. Mental status is at baseline.   Psychiatric:      Mood and Affect: Mood normal.      Behavior: Behavior normal.      I have personally reviewed results with the patient.    Her vitamin D level is low. Her cholesterol is slightly elevated. Her rheumatoid factor was slightly positive.    Imaging:   No results found.     Labs:   Office Visit on 03/22/2024   Component Date Value Ref Range Status   • Cholesterol 03/23/2024 266 (H)  See Comment mg/dL Final    Cholesterol:         Pediatric <18 Years        Desirable          <170 mg/dL      Borderline High    170-199 mg/dL      High               >=200 mg/dL        Adult >=18 Years   "          Desirable         <200 mg/dL      Borderline High   200-239 mg/dL      High              >239 mg/dL     • Triglycerides 03/23/2024 123  See Comment mg/dL Final    Triglyceride:     0-9Y            <75mg/dL     10Y-17Y         <90 mg/dL       >=18Y     Normal          <150 mg/dL     Borderline High 150-199 mg/dL     High            200-499 mg/dL        Very High       >499 mg/dL    Specimen collection should occur prior to Metamizole administration due to the potential for falsely depressed results.   • HDL, Direct 03/23/2024 65  >=50 mg/dL Final   • LDL Calculated 03/23/2024 176 (H)  0 - 100 mg/dL Final    LDL Cholesterol:     Optimal           <100 mg/dl     Near Optimal      100-129 mg/dl     Above Optimal       Borderline High 130-159 mg/dl       High            160-189 mg/dl       Very High       >189 mg/dl         This screening LDL is a calculated result.   It does not have the accuracy of the Direct Measured LDL in the monitoring of patients with hyperlipidemia and/or statin therapy.   Direct Measure LDL ([id number]) must be ordered separately in these patients.   • Non-HDL-Chol (CHOL-HDL) 03/23/2024 201  mg/dl Final   Consult on 03/22/2024   Component Date Value Ref Range Status   • WBC 03/23/2024 6.23  4.31 - 10.16 Thousand/uL Final   • RBC 03/23/2024 4.11  3.81 - 5.12 Million/uL Final   • Hemoglobin 03/23/2024 12.0  11.5 - 15.4 g/dL Final   • Hematocrit 03/23/2024 36.5  34.8 - 46.1 % Final   • MCV 03/23/2024 89  82 - 98 fL Final   • MCH 03/23/2024 29.2  26.8 - 34.3 pg Final   • MCHC 03/23/2024 32.9  31.4 - 37.4 g/dL Final   • RDW 03/23/2024 13.4  11.6 - 15.1 % Final   • MPV 03/23/2024 12.2  8.9 - 12.7 fL Final   • Platelets 03/23/2024 228  149 - 390 Thousands/uL Final   • nRBC 03/23/2024 0  /100 WBCs Final   • Neutrophils Relative 03/23/2024 49  43 - 75 % Final   • Immature Grans % 03/23/2024 0  0 - 2 % Final   • Lymphocytes Relative 03/23/2024 38  14 - 44 % Final   • Monocytes Relative 03/23/2024 7   4 - 12 % Final   • Eosinophils Relative 03/23/2024 5  0 - 6 % Final   • Basophils Relative 03/23/2024 1  0 - 1 % Final   • Neutrophils Absolute 03/23/2024 3.07  1.85 - 7.62 Thousands/µL Final   • Absolute Immature Grans 03/23/2024 0.01  0.00 - 0.20 Thousand/uL Final   • Absolute Lymphocytes 03/23/2024 2.34  0.60 - 4.47 Thousands/µL Final   • Absolute Monocytes 03/23/2024 0.45  0.17 - 1.22 Thousand/µL Final   • Eosinophils Absolute 03/23/2024 0.29  0.00 - 0.61 Thousand/µL Final   • Basophils Absolute 03/23/2024 0.07  0.00 - 0.10 Thousands/µL Final   • Sodium 03/23/2024 141  135 - 147 mmol/L Final   • Potassium 03/23/2024 3.8  3.5 - 5.3 mmol/L Final   • Chloride 03/23/2024 106  96 - 108 mmol/L Final   • CO2 03/23/2024 27  21 - 32 mmol/L Final   • ANION GAP 03/23/2024 8  4 - 13 mmol/L Final   • BUN 03/23/2024 13  5 - 25 mg/dL Final   • Creatinine 03/23/2024 0.67  0.60 - 1.30 mg/dL Final    Standardized to IDMS reference method   • Glucose, Fasting 03/23/2024 85  65 - 99 mg/dL Final   • Calcium 03/23/2024 8.8  8.4 - 10.2 mg/dL Final   • AST 03/23/2024 35  13 - 39 U/L Final   • ALT 03/23/2024 41  7 - 52 U/L Final    Specimen collection should occur prior to Sulfasalazine administration due to the potential for falsely depressed results.    • Alkaline Phosphatase 03/23/2024 79  34 - 104 U/L Final   • Total Protein 03/23/2024 7.2  6.4 - 8.4 g/dL Final   • Albumin 03/23/2024 4.2  3.5 - 5.0 g/dL Final   • Total Bilirubin 03/23/2024 0.46  0.20 - 1.00 mg/dL Final    Use of this assay is not recommended for patients undergoing treatment with eltrombopag due to the potential for falsely elevated results.  N-acetyl-p-benzoquinone imine (metabolite of Acetaminophen) will generate erroneously low results in samples for patients that have taken an overdose of Acetaminophen.   • eGFR 03/23/2024 103  ml/min/1.73sq m Final   • Sed Rate 03/23/2024 12  0 - 19 mm/hour Final   • CRP 03/23/2024 5.9 (H)  <3.0 mg/L Final   • Hepatitis B  Surface Ag 03/23/2024 Non-reactive  Non-Reactive Final   • Hepatitis C Ab 03/23/2024 Non-reactive  Non-Reactive Final   • Hep B C IgM 03/23/2024 Non-reactive  Non-Reactive Final   • Hep B Core Total Ab 03/23/2024 Non-reactive  Non-Reactive Final   Appointment on 03/06/2024   Component Date Value Ref Range Status   • Color, UA 03/06/2024 Light Yellow   Final   • Clarity, UA 03/06/2024 Clear   Final   • Specific Gravity, UA 03/06/2024 1.014  1.003 - 1.030 Final   • pH, UA 03/06/2024 7.5  4.5, 5.0, 5.5, 6.0, 6.5, 7.0, 7.5, 8.0 Final   • Leukocytes, UA 03/06/2024 Negative  Negative Final   • Nitrite, UA 03/06/2024 Negative  Negative Final   • Protein, UA 03/06/2024 Trace (A)  Negative mg/dl Final   • Glucose, UA 03/06/2024 Negative  Negative mg/dl Final   • Ketones, UA 03/06/2024 Negative  Negative mg/dl Final   • Urobilinogen, UA 03/06/2024 <2.0  <2.0 mg/dl mg/dl Final   • Bilirubin, UA 03/06/2024 Negative  Negative Final   • Occult Blood, UA 03/06/2024 Negative  Negative Final   • RBC, UA 03/06/2024 1-2  None Seen, 1-2 /hpf Final   • WBC, UA 03/06/2024 1-2  None Seen, 1-2 /hpf Final   • Epithelial Cells 03/06/2024 Occasional  None Seen, Occasional /hpf Final   • Bacteria, UA 03/06/2024 Occasional  None Seen, Occasional /hpf Final   Office Visit on 02/22/2024   Component Date Value Ref Range Status   • Cholesterol 03/06/2024 244 (H)  See Comment mg/dL Final    Cholesterol:         Pediatric <18 Years        Desirable          <170 mg/dL      Borderline High    170-199 mg/dL      High               >=200 mg/dL        Adult >=18 Years            Desirable         <200 mg/dL      Borderline High   200-239 mg/dL      High              >239 mg/dL     • Triglycerides 03/06/2024 243 (H)  See Comment mg/dL Final    Triglyceride:     0-9Y            <75mg/dL     10Y-17Y         <90 mg/dL       >=18Y     Normal          <150 mg/dL     Borderline High 150-199 mg/dL     High            200-499 mg/dL        Very High       >499  mg/dL    Specimen collection should occur prior to Metamizole administration due to the potential for falsely depressed results.   • HDL, Direct 03/06/2024 59  >=50 mg/dL Final   • LDL Calculated 03/06/2024 136 (H)  0 - 100 mg/dL Final    LDL Cholesterol:     Optimal           <100 mg/dl     Near Optimal      100-129 mg/dl     Above Optimal       Borderline High 130-159 mg/dl       High            160-189 mg/dl       Very High       >189 mg/dl         This screening LDL is a calculated result.   It does not have the accuracy of the Direct Measured LDL in the monitoring of patients with hyperlipidemia and/or statin therapy.   Direct Measure LDL (QNO834) must be ordered separately in these patients.   • Non-HDL-Chol (CHOL-HDL) 03/06/2024 185  mg/dl Final   • Sodium 03/06/2024 142  135 - 147 mmol/L Final   • Potassium 03/06/2024 3.9  3.5 - 5.3 mmol/L Final   • Chloride 03/06/2024 105  96 - 108 mmol/L Final   • CO2 03/06/2024 27  21 - 32 mmol/L Final   • ANION GAP 03/06/2024 10  mmol/L Final   • BUN 03/06/2024 11  5 - 25 mg/dL Final   • Creatinine 03/06/2024 0.72  0.60 - 1.30 mg/dL Final    Standardized to IDMS reference method   • Glucose, Fasting 03/06/2024 94  65 - 99 mg/dL Final   • Calcium 03/06/2024 8.7  8.4 - 10.2 mg/dL Final   • AST 03/06/2024 26  13 - 39 U/L Final   • ALT 03/06/2024 30  7 - 52 U/L Final    Specimen collection should occur prior to Sulfasalazine administration due to the potential for falsely depressed results.    • Alkaline Phosphatase 03/06/2024 79  34 - 104 U/L Final   • Total Protein 03/06/2024 7.1  6.4 - 8.4 g/dL Final   • Albumin 03/06/2024 4.1  3.5 - 5.0 g/dL Final   • Total Bilirubin 03/06/2024 0.61  0.20 - 1.00 mg/dL Final    Use of this assay is not recommended for patients undergoing treatment with eltrombopag due to the potential for falsely elevated results.  N-acetyl-p-benzoquinone imine (metabolite of Acetaminophen) will generate erroneously low results in samples for patients  that have taken an overdose of Acetaminophen.   • eGFR 03/06/2024 98  ml/min/1.73sq m Final   • WBC 03/06/2024 6.07  4.31 - 10.16 Thousand/uL Final   • RBC 03/06/2024 4.16  3.81 - 5.12 Million/uL Final   • Hemoglobin 03/06/2024 12.1  11.5 - 15.4 g/dL Final   • Hematocrit 03/06/2024 35.1  34.8 - 46.1 % Final   • MCV 03/06/2024 84  82 - 98 fL Final   • MCH 03/06/2024 29.1  26.8 - 34.3 pg Final   • MCHC 03/06/2024 34.5  31.4 - 37.4 g/dL Final   • RDW 03/06/2024 13.3  11.6 - 15.1 % Final   • MPV 03/06/2024 11.9  8.9 - 12.7 fL Final   • Platelets 03/06/2024 216  149 - 390 Thousands/uL Final   • nRBC 03/06/2024 0  /100 WBCs Final   • Neutrophils Relative 03/06/2024 49  43 - 75 % Final   • Immature Grans % 03/06/2024 0  0 - 2 % Final   • Lymphocytes Relative 03/06/2024 37  14 - 44 % Final   • Monocytes Relative 03/06/2024 8  4 - 12 % Final   • Eosinophils Relative 03/06/2024 5  0 - 6 % Final   • Basophils Relative 03/06/2024 1  0 - 1 % Final   • Neutrophils Absolute 03/06/2024 2.98  1.85 - 7.62 Thousands/µL Final   • Absolute Immature Grans 03/06/2024 0.02  0.00 - 0.20 Thousand/uL Final   • Absolute Lymphocytes 03/06/2024 2.24  0.60 - 4.47 Thousands/µL Final   • Absolute Monocytes 03/06/2024 0.49  0.17 - 1.22 Thousand/µL Final   • Eosinophils Absolute 03/06/2024 0.28  0.00 - 0.61 Thousand/µL Final   • Basophils Absolute 03/06/2024 0.06  0.00 - 0.10 Thousands/µL Final   • TSH 3RD GENERATON 03/06/2024 3.293  0.450 - 4.500 uIU/mL Final    The recommended reference ranges for TSH during pregnancy are as follows:   First trimester 0.100 to 2.500 uIU/mL   Second trimester  0.200 to 3.000 uIU/mL   Third trimester 0.300 to 3.000 uIU/m    Note: Normal ranges may not apply to patients who are transgender, non-binary, or whose legal sex, sex at birth, and gender identity differ.  Adult TSH (3rd generation) reference range follows the recommended guidelines of the American Thyroid Association, January, 2020.   • Vit D, 25-Hydroxy  03/06/2024 18.0 (L)  30.0 - 100.0 ng/mL Final    Vitamin D guidelines established by Clinical Guidelines Subcommittee  of the Endocrine Society Task Force, 2011    Deficiency <20ng/ml   Insufficiency 20-30ng/ml   Sufficient  ng/ml    Office Visit on 01/17/2024   Component Date Value Ref Range Status   • POCT SARS-CoV-2 Ag 01/17/2024 Negative  Negative Final   • VALID CONTROL 01/17/2024 Valid   Final   Appointment on 12/14/2023   Component Date Value Ref Range Status   • Varicella IgG 12/14/2023 NON-IMMUNE (A)  IMMUNE Final    Presumed non-immune to VZV IgG infection.   • Mumps IgG 12/14/2023 IMMUNE  IMMUNE Final    Presumed immune to Mumps IgG infection.   • Rubella IgG Quant 12/14/2023 118.8  >14.9 IU/mL Final   • Rubeola IgG 12/14/2023 IMMUNE  IMMUNE Final    Presumed immune to Measles IgG infection   • QFT Nil 12/14/2023 0.14  0 - 8.0 IU/ml Final   • QFT TB1-NIL 12/14/2023 0.04  IU/ml Final   • QFT TB2-NIL 12/14/2023 0.04  IU/ml Final   • QFT Mitogen-NIL 12/14/2023 9.86  IU/ml Final   • QFT Final Interpretation 12/14/2023 Negative  Negative Final    No Interferon-gamma response to M. tuberculosis antigens detected.  Infection with M. tuberculosis is unlikely.  A single negative result does not exclude infection with M. tuberculosis. In patients at high risk for M. tuberculosis infection, a second test should be considered in accordance with the 2017 ATS/IDSA/CDC Clinical Practice Guidelines for Diagnosis of Tuberculosis in Adults and Children. False negative results can be a result of incorrect blood sample collection or handling of the specimen affecting lymphocyte function.       Transcribed for Marcel Looney MD, by Isak Haider on 03/31/24 at 1:26 PM. Powered by [employer].

## 2024-03-22 NOTE — PROGRESS NOTES
Assessment/Plan:           Problem List Items Addressed This Visit    None  Visit Diagnoses       Other fatigue    -  Primary    Blood pressure check        Cervical cancer screening        Hyperlipidemia, unspecified hyperlipidemia type        Relevant Orders    Comprehensive metabolic panel    Lipid panel         See plan above.  I will be seeing her back in 6 months.    Subjective:      Patient ID: Bella Almaguer is a 48 y.o. female.    HPI  Patient is here to follow-up on hypertension.  Blood pressure is excellent.  I encouraged her to continue monitoring her blood pressure at home.  Denies any chest pain shortness of breath no further headaches still feels mildly congested.  She is not using any decongestant.  She was seen by rheumatology and is started on Areva.  Patient is awaiting sleep study.  She also started vitamin D supplement that were called into her pharmacy.  She is having episodes of extreme fatigue especially after she has a lunch.  On asking she does not breakfast and her first meal of the day is lunch.  We discussed starting the day with breakfast even a simple 1 and having protein-based lunch.  There is a possibility of dumping syndrome hypoglycemia proceeding diabetes mellitus.  Patient does have acanthosis nigricans.  Strong family history of brittle diabetes in her mother.     The following portions of the patient's history were reviewed and updated as appropriate: allergies, current medications, past family history, past medical history, past social history, past surgical history, and problem list.    Review of Systems      Objective:      /86 (BP Location: Right arm, Patient Position: Sitting, Cuff Size: Standard)   Pulse 80   Temp 97.5 °F (36.4 °C)   Wt 82.1 kg (181 lb)   SpO2 96%   BMI 30.12 kg/m²          Physical Exam  Cardiovascular:      Rate and Rhythm: Normal rate and regular rhythm.      Heart sounds: Normal heart sounds. No murmur heard.  Pulmonary:      Effort:  Pulmonary effort is normal. No respiratory distress.      Breath sounds: Normal breath sounds. No wheezing or rales.   Neurological:      Mental Status: She is alert.

## 2024-03-23 ENCOUNTER — APPOINTMENT (OUTPATIENT)
Dept: LAB | Age: 49
End: 2024-03-23
Payer: COMMERCIAL

## 2024-03-23 DIAGNOSIS — Z79.899 HIGH RISK MEDICATION USE: ICD-10-CM

## 2024-03-23 DIAGNOSIS — M05.79 RHEUMATOID ARTHRITIS INVOLVING MULTIPLE SITES WITH POSITIVE RHEUMATOID FACTOR (HCC): ICD-10-CM

## 2024-03-23 LAB
ALBUMIN SERPL BCP-MCNC: 4.2 G/DL (ref 3.5–5)
ALP SERPL-CCNC: 79 U/L (ref 34–104)
ALT SERPL W P-5'-P-CCNC: 41 U/L (ref 7–52)
ANION GAP SERPL CALCULATED.3IONS-SCNC: 8 MMOL/L (ref 4–13)
AST SERPL W P-5'-P-CCNC: 35 U/L (ref 13–39)
BASOPHILS # BLD AUTO: 0.07 THOUSANDS/ÂΜL (ref 0–0.1)
BASOPHILS NFR BLD AUTO: 1 % (ref 0–1)
BILIRUB SERPL-MCNC: 0.46 MG/DL (ref 0.2–1)
BUN SERPL-MCNC: 13 MG/DL (ref 5–25)
CALCIUM SERPL-MCNC: 8.8 MG/DL (ref 8.4–10.2)
CHLORIDE SERPL-SCNC: 106 MMOL/L (ref 96–108)
CHOLEST SERPL-MCNC: 266 MG/DL
CO2 SERPL-SCNC: 27 MMOL/L (ref 21–32)
CREAT SERPL-MCNC: 0.67 MG/DL (ref 0.6–1.3)
CRP SERPL QL: 5.9 MG/L
EOSINOPHIL # BLD AUTO: 0.29 THOUSAND/ÂΜL (ref 0–0.61)
EOSINOPHIL NFR BLD AUTO: 5 % (ref 0–6)
ERYTHROCYTE [DISTWIDTH] IN BLOOD BY AUTOMATED COUNT: 13.4 % (ref 11.6–15.1)
ERYTHROCYTE [SEDIMENTATION RATE] IN BLOOD: 12 MM/HOUR (ref 0–19)
GFR SERPL CREATININE-BSD FRML MDRD: 103 ML/MIN/1.73SQ M
GLUCOSE P FAST SERPL-MCNC: 85 MG/DL (ref 65–99)
HCT VFR BLD AUTO: 36.5 % (ref 34.8–46.1)
HDLC SERPL-MCNC: 65 MG/DL
HGB BLD-MCNC: 12 G/DL (ref 11.5–15.4)
IMM GRANULOCYTES # BLD AUTO: 0.01 THOUSAND/UL (ref 0–0.2)
IMM GRANULOCYTES NFR BLD AUTO: 0 % (ref 0–2)
LDLC SERPL CALC-MCNC: 176 MG/DL (ref 0–100)
LYMPHOCYTES # BLD AUTO: 2.34 THOUSANDS/ÂΜL (ref 0.6–4.47)
LYMPHOCYTES NFR BLD AUTO: 38 % (ref 14–44)
MCH RBC QN AUTO: 29.2 PG (ref 26.8–34.3)
MCHC RBC AUTO-ENTMCNC: 32.9 G/DL (ref 31.4–37.4)
MCV RBC AUTO: 89 FL (ref 82–98)
MONOCYTES # BLD AUTO: 0.45 THOUSAND/ÂΜL (ref 0.17–1.22)
MONOCYTES NFR BLD AUTO: 7 % (ref 4–12)
NEUTROPHILS # BLD AUTO: 3.07 THOUSANDS/ÂΜL (ref 1.85–7.62)
NEUTS SEG NFR BLD AUTO: 49 % (ref 43–75)
NONHDLC SERPL-MCNC: 201 MG/DL
NRBC BLD AUTO-RTO: 0 /100 WBCS
PLATELET # BLD AUTO: 228 THOUSANDS/UL (ref 149–390)
PMV BLD AUTO: 12.2 FL (ref 8.9–12.7)
POTASSIUM SERPL-SCNC: 3.8 MMOL/L (ref 3.5–5.3)
PROT SERPL-MCNC: 7.2 G/DL (ref 6.4–8.4)
RBC # BLD AUTO: 4.11 MILLION/UL (ref 3.81–5.12)
SODIUM SERPL-SCNC: 141 MMOL/L (ref 135–147)
TRIGL SERPL-MCNC: 123 MG/DL
WBC # BLD AUTO: 6.23 THOUSAND/UL (ref 4.31–10.16)

## 2024-03-23 PROCEDURE — 87340 HEPATITIS B SURFACE AG IA: CPT | Performed by: INTERNAL MEDICINE

## 2024-03-23 PROCEDURE — 80061 LIPID PANEL: CPT | Performed by: INTERNAL MEDICINE

## 2024-03-23 PROCEDURE — 85025 COMPLETE CBC W/AUTO DIFF WBC: CPT | Performed by: INTERNAL MEDICINE

## 2024-03-23 PROCEDURE — 36415 COLL VENOUS BLD VENIPUNCTURE: CPT | Performed by: INTERNAL MEDICINE

## 2024-03-23 PROCEDURE — 86803 HEPATITIS C AB TEST: CPT | Performed by: INTERNAL MEDICINE

## 2024-03-23 PROCEDURE — 85652 RBC SED RATE AUTOMATED: CPT | Performed by: INTERNAL MEDICINE

## 2024-03-23 PROCEDURE — 86704 HEP B CORE ANTIBODY TOTAL: CPT | Performed by: INTERNAL MEDICINE

## 2024-03-23 PROCEDURE — 86140 C-REACTIVE PROTEIN: CPT | Performed by: INTERNAL MEDICINE

## 2024-03-23 PROCEDURE — 86705 HEP B CORE ANTIBODY IGM: CPT | Performed by: INTERNAL MEDICINE

## 2024-03-23 PROCEDURE — 80053 COMPREHEN METABOLIC PANEL: CPT | Performed by: INTERNAL MEDICINE

## 2024-03-24 LAB
HBV CORE AB SER QL: NORMAL
HBV CORE IGM SER QL: NORMAL
HBV SURFACE AG SER QL: NORMAL
HCV AB SER QL: NORMAL

## 2024-03-27 NOTE — RESULT ENCOUNTER NOTE
It appears patient went for blood work sooner than she should have.  This was meant to be done several months out.

## 2024-04-08 NOTE — PROGRESS NOTES
"Derrick Almaguer is a 48 y.o. female who presents for annual well woman exam.     GYN:  Reports labial bumps on left side; non-tender, present for about 1 month  Reports last period was last spring; reports one episode of spotting after intercourse   Contraception: tubal ligation  Patient is currently sexually active with her male partner.  Hx simple endometrial hyperplasia w/o atypia, hx endometrial polyp, s/p D&C; hx ovarian cyst removal; reports hx of endometrial ablation, although this not documented in the operative report    OB:   female  2 prior  sections    :  Denies dysuria, urinary frequency or urgency.  Denies hematuria, flank pain, incontinence.  Denies constipation, diarrhea    Breast:  Denies breast mass, skin changes, dimpling, reddening, nipple retraction.  Denies breast discharge.  Patient's maternal grandmother had breast cancer, and her paternal aunt had ovarian cancer and breast cancer    General:  Diet: Reviewed dietary calcium recommendations  Exercise: Reviewed recommendation of 150 minutes of moderate intensity exercise per week  ETOH use: rarely  Tobacco use: no  Recreational drug use: no    Screening:  Cervical cancer: last pap smear in 2023. Results were NILM/HPV neg.  Breast cancer: last mammogram in 2023. Results were normal.  Colon cancer: last colonoscopy in . Results were normal    Review of Systems  Pertinent items are noted in HPI.      Objective      /98 (BP Location: Left arm, Patient Position: Sitting, Cuff Size: Standard)   Ht 5' 5\" (1.651 m)   Wt 82.6 kg (182 lb)   LMP 2023 (Approximate) Comment: rare spotting since then  BMI 30.29 kg/m²     Physical Exam  Exam conducted with a chaperone present.   Constitutional:       Appearance: Normal appearance.   HENT:      Head: Normocephalic and atraumatic.   Cardiovascular:      Rate and Rhythm: Normal rate.   Pulmonary:      Effort: Pulmonary effort is normal. No " respiratory distress.   Chest:   Breasts:     Right: Normal. No inverted nipple, mass or tenderness.      Left: Normal. No inverted nipple, mass or tenderness.   Abdominal:      Palpations: Abdomen is soft.      Tenderness: There is no abdominal tenderness.   Genitourinary:     Vagina: No vaginal discharge.      Comments: Three small bumps on left labia majora, not erythematous, not pustular, not tender; appear to be small inclusion cysts; cervix appears normal, normal vaginal mucosa; uterus retroverted, normal contour; no adnexal masses palpated  Musculoskeletal:         General: Normal range of motion.   Skin:     General: Skin is warm and dry.   Neurological:      Mental Status: She is alert.             Assessment     47 yo presents today for her annual exam     Plan   - Mammogram ordered for next year  - Estradiol, FSH, LH, pelvic US ordered to assess amenorrhea

## 2024-04-10 ENCOUNTER — OFFICE VISIT (OUTPATIENT)
Dept: OBGYN CLINIC | Facility: CLINIC | Age: 49
End: 2024-04-10
Payer: COMMERCIAL

## 2024-04-10 VITALS
DIASTOLIC BLOOD PRESSURE: 98 MMHG | HEIGHT: 65 IN | SYSTOLIC BLOOD PRESSURE: 142 MMHG | BODY MASS INDEX: 30.32 KG/M2 | WEIGHT: 182 LBS

## 2024-04-10 DIAGNOSIS — N91.2 AMENORRHEA: ICD-10-CM

## 2024-04-10 DIAGNOSIS — Z01.419 VISIT FOR GYNECOLOGIC EXAMINATION: ICD-10-CM

## 2024-04-10 DIAGNOSIS — Z01.419 WOMEN'S ANNUAL ROUTINE GYNECOLOGICAL EXAMINATION: Primary | ICD-10-CM

## 2024-04-10 PROCEDURE — S0610 ANNUAL GYNECOLOGICAL EXAMINA: HCPCS | Performed by: OBSTETRICS & GYNECOLOGY

## 2024-04-10 NOTE — PATIENT INSTRUCTIONS
Calcium supplement with 500-600 mg of Calcium, with Vitamin D; Should be getting 1000 mg of calcium from your diet

## 2024-04-16 DIAGNOSIS — M05.79 RHEUMATOID ARTHRITIS INVOLVING MULTIPLE SITES WITH POSITIVE RHEUMATOID FACTOR (HCC): Primary | ICD-10-CM

## 2024-04-16 RX ORDER — PREDNISONE 5 MG/1
TABLET ORAL
Qty: 70 TABLET | Refills: 0 | Status: SHIPPED | OUTPATIENT
Start: 2024-04-16

## 2024-04-22 PROBLEM — H16.223 KERATOCONJUNCTIVITIS SICCA OF BOTH EYES NOT SPECIFIED AS SJOGREN'S: Status: RESOLVED | Noted: 2022-09-01 | Resolved: 2024-04-22

## 2024-05-28 ENCOUNTER — OFFICE VISIT (OUTPATIENT)
Dept: SLEEP CENTER | Facility: CLINIC | Age: 49
End: 2024-05-28

## 2024-05-28 VITALS
SYSTOLIC BLOOD PRESSURE: 146 MMHG | WEIGHT: 185 LBS | DIASTOLIC BLOOD PRESSURE: 64 MMHG | BODY MASS INDEX: 30.82 KG/M2 | HEART RATE: 77 BPM | HEIGHT: 65 IN

## 2024-05-28 DIAGNOSIS — G47.30 SLEEP APNEA, UNSPECIFIED TYPE: ICD-10-CM

## 2024-05-28 DIAGNOSIS — G47.33 OSA (OBSTRUCTIVE SLEEP APNEA): Primary | ICD-10-CM

## 2024-05-28 DIAGNOSIS — G47.19 EXCESSIVE DAYTIME SLEEPINESS: ICD-10-CM

## 2024-05-28 NOTE — ASSESSMENT & PLAN NOTE
Patient has a history of obstructive sleep apnea dating back to 2017.  She is currently not using a CPAP as she had to return it for noncompliance.  Repeat home sleep study in March 2023 was negative for obstructive sleep apnea.  Due to her symptoms of excessive daytime sleepiness, loud snoring, waking with gasping and choking, I believe it is likely she still has obstructive sleep apnea.  Home sleep study was ordered today to reevaluate.  If her home sleep study shows obstructive sleep apnea, I will order a CPAP and have her follow-up in 31 to 90 days.  If her home sleep study is inconclusive, I would recommend doing an in lab sleep study.

## 2024-05-28 NOTE — PROGRESS NOTES
Consultation - [facility]  Bella Almaguer, 1975, MRN: [id number]    5/28/2024        Reason for Consult / Principal Problem:    Obstructive Sleep Apnea       Thank you for the opportunity of participating in the evaluation and care of this patient in the Sleep Clinic at [facility].      Subjective:     HPI: Bella Almaguer is a 48 y.o. year old female who presents today to establish care for obstructive sleep apnea.  She was initially diagnosed with obstructive sleep apnea in 2017 after completing a diagnostic sleep study demonstrating an AHI of 7.2 and supine AHI of 11.2.  She states at that time she was not set up with CPAP.  She then completed a home sleep study in June 2022, which demonstrated moderate obstructive sleep apnea.  She was then set up with CPAP in August 2022.  She states she used it for only about 2 weeks and discontinued use due to comfort and mask fitting issues.  She felt the machine was not functioning properly.  She returned the machine due to noncompliance.  She was then told in order to get a new CPAP machine, she would need a new sleep study.  She then completed a home sleep study in March 2023, which showed an PAUL of 2.2.  As a sleep study did not show sleep apnea, she was unable to get a new CPAP machine.  She states she has gained weight and noted her snoring to be much worse.  She states she wakes up gasping and choking during the night.  She feels she definitely still has sleep apnea and would like to start CPAP treatment as soon as possible.    Comorbid conditions:  Mild intermittent asthma, rheumatoid arthritis    Recent Labs:  CO2 of 27, CBC normal, TSH normal     Sleep Study Results: 2017 diagnostic sleep study with an AHI of 7.2  HST June 2022 demonstrated moderate obstructive sleep apnea  HST March 2023 was negative for sleep apnea as PAUL was 2.2.  I got this information from previous notes from [facility].  We were  unable to obtain the actual sleep study results.    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, no drowsy with short distances, avoids long distances more then 1 hour, no CDL    Sleep Schedule:       Bedtime: 10 PM      Latency:  1 hour, tossing or turning       Wakeup time:  6 AM on workdays, 7 AM on days off     Awakenings:       Frequency:  4-5 times per night      Causes:  snoring, choking       Duration:  brief     Daytime Sleepiness / Inappropriate Sleep:       Most severe:  after lunch        Naps :  no time to naps, but would if given time      Inappropriate drowsiness / sleep:  with TV     Snoring:  loud snoring, gasping, choking     Apnea:  witnessed by family     Change in Weight:  gain of 45 pounds in last 3 years     Restless Leg Syndrome:  no clinical symptoms consistent with this diagnosis     Other Complaints:   No reports of sleep walking, sleep talking, sleep paralysis or hallucinations surrounding sleep.  + waking up with headaches, bruxism, and + dry mouth.     Social History:      Caffeine:  one coffee        Tobacco:   reports that she has never smoked. She has never been exposed to tobacco smoke. She has never used smokeless tobacco.     E-cig/Vaping:    E-Cigarette/Vaping    E-Cigarette Use Never User       E-Cigarette/Vaping Substances    Nicotine No     THC No     CBD No     Flavoring No     Other No     Unknown No          Alcohol:   reports current alcohol use.       Drugs:   reports no history of drug use.       The review of systems and following portions of the patient's history were reviewed and updated as appropriate: allergies, current medications, past family history, past medical history, past social history, past surgical history and problem  "list.        Objective:       Vitals:    05/28/24 0849   BP: 146/64   BP Location: Left arm   Patient Position: Sitting   Cuff Size: Large   Pulse: 77   Weight: 83.9 kg (185 lb)   Height: 5' 5\" (1.651 m)     Body mass index is 30.79 kg/m².  Neck Circumference: 15  Chester Sleepiness Scale: Total score: 18      Current Outpatient Medications:     ACETAMINOPHEN 8 HOUR PO, Take 500 mg by mouth every 6 (six) hours as needed, Disp: , Rfl:     albuterol (PROVENTIL HFA,VENTOLIN HFA) 90 mcg/act inhaler, Inhale 2 puffs every 6 (six) hours as needed for wheezing, Disp: , Rfl:     amitriptyline (ELAVIL) 25 mg tablet, Take by mouth, Disp: , Rfl:     Cholecalciferol 50 MCG (2000 UT) CAPS, Take 1 capsule by mouth every morning, Disp: , Rfl:     cycloSPORINE (Restasis) 0.05 % ophthalmic emulsion, Administer 1 drop to both eyes 2 (two) times a day, Disp: , Rfl:     diclofenac (VOLTAREN) 75 mg EC tablet, Take 1 tablet (75 mg total) by mouth 2 (two) times a day As needed for joint pain, take with food, Disp: 60 tablet, Rfl: 6    ergocalciferol (VITAMIN D2) 50,000 units, Take 1 capsule (50,000 Units total) by mouth once a week, Disp: 8 capsule, Rfl: 0    fluticasone (FLONASE) 50 mcg/act nasal spray, 2 sprays into each nostril daily, Disp: , Rfl:     ibuprofen (MOTRIN) 200 mg tablet, Take 200 mg by mouth every 6 (six) hours as needed, Disp: , Rfl:     leflunomide (ARAVA) 10 MG tablet, Take 1 tablet (10 mg total) by mouth daily, Disp: 30 tablet, Rfl: 6    levocetirizine (XYZAL) 5 MG tablet, Take 5 mg by mouth, Disp: , Rfl:     loratadine (CLARITIN) 10 mg tablet, Take 1 tablet by mouth daily, Disp: , Rfl:     MAGNESIUM CITRATE PO, Take 400 mg by mouth Takes once daily, Disp: , Rfl:     meclizine (ANTIVERT) 25 mg tablet, Take by mouth, Disp: , Rfl:     predniSONE 5 mg tablet, 4 tabs x7 days, then 3 tabs x7 days, then 2 tabs x7 days, then 1 tab x7 days, then stop., Disp: 70 tablet, Rfl: 0    triamcinolone (KENALOG) 0.1 % ointment, Apply 1 " application. topically 2 (two) times a day, Disp: , Rfl:     Vitamin D, Ergocalciferol, 02880 units CAPS, Take 1 capsule by mouth once a week, Disp: , Rfl:     rizatriptan (MAXALT-MLT) 10 mg disintegrating tablet, Take 10 mg by mouth (Patient not taking: Reported on 2/22/2024), Disp: , Rfl:     Physical Exam  General Appearance:   Alert, cooperative, no distress, appears stated age, obese     Head:   Normocephalic, without obvious abnormality, atraumatic     Eyes:   PERRL, conjunctiva/corneas clear          Nose:  Nares normal, septum midline, mucosa normal, no drainage or sinus tenderness           Throat:  Lips, teeth and gums normal; tongue thick in size and midline in position; mucosa moist, uvula normal, tonsils not visualized, Mallampati class 3-4       Neck:  Supple, symmetrical, trachea midline, no adenopathy; no thyromegaly noted, no carotid bruit or JVD     Lungs:      Clear to auscultation bilaterally, respirations unlabored     Heart:   Regular rate and rhythm, S1 and S2 normal, no murmur, rub or gallop       Extremities:  Extremities normal, atraumatic, no cyanosis or edema       Skin:  Skin color, texture, turgor normal, no rashes or lesions       Neurologic:  No focal deficits noted.          ASSESSMENT / PLAN     1. CHRISTOPHER (obstructive sleep apnea)  Assessment & Plan:  Patient has a history of obstructive sleep apnea dating back to 2017.  She is currently not using a CPAP as she had to return it for noncompliance.  Repeat home sleep study in March 2023 was negative for obstructive sleep apnea.  Due to her symptoms of excessive daytime sleepiness, loud snoring, waking with gasping and choking, I believe it is likely she still has obstructive sleep apnea.  Home sleep study was ordered today to reevaluate.  If her home sleep study shows obstructive sleep apnea, I will order a CPAP and have her follow-up in 31 to 90 days.  If her home sleep study is inconclusive, I would recommend doing an in lab sleep  study.  Orders:  -     Home Study; Future; Expected date: 05/28/2024  -     Ambulatory Referral to Weight Management; Future; Expected date: 05/28/2024  2. Sleep apnea, unspecified type  -     Ambulatory Referral to Sleep Medicine  3. Excessive daytime sleepiness  -     Home Study; Future; Expected date: 05/28/2024  4. BMI 30.0-30.9,adult  Assessment & Plan:  We discussed that weight loss can be beneficial for overall health and can improve and sometimes even resolve obstructive sleep apnea.  I placed an order today for a consult with weight management.  Patient feels motivated to try to lose weight.  Orders:  -     Ambulatory Referral to Weight Management; Future; Expected date: 05/28/2024         Counseling / Coordination of Care    I have spent a total time of 55 minutes on 05/28/24 in caring for this patient including Diagnostic results, Prognosis, Risks and benefits of tx options, Instructions for management, Patient and family education, Importance of tx compliance, Risk factor reductions, Impressions, Counseling / Coordination of care, Documenting in the medical record, Reviewing / ordering tests, medicine, procedures  , and Obtaining or reviewing history  .    The following instructions have been given to the patient today:    Patient Instructions   I placed an order today for a new home sleep study to reevaluate your sleep apnea.  If the home sleep study shows sleep apnea again, I will order you a CPAP and follow-up with you in 31 to 90 days.  If your home sleep study is inconclusive, I would recommend doing an in lab sleep study.  I also put a referral to weight management.  With weight loss, it is possible to improve and even resolve your obstructive sleep apnea.  They should call you to schedule an appointment.    Nursing Support:  When: Monday through Friday 7A-5PM except holidays  Where: Our direct line is [phone].    If you are having a true emergency please call 911.  In the event that the line  is busy or it is after hours please leave a voice message and we will return your call.  Please speak clearly, leaving your full name, birth date, best number to reach you and the reason for your call.   Medication refills: We will need the name of the medication, the dosage, the ordering provider, whether you get a 30 or 90 day refill, and the pharmacy name and address.  Medications will be ordered by the provider only.  Nurses cannot call in prescriptions.  Please allow 7 days for medication refills.  Physician requested updates: If your provider requested that you call with an update after starting medication, please be ready to provide us the medication and dosage, what time you take your medication, the time you attempt to fall asleep, time you fall asleep, when you wake up, and what time you get out of bed.  Sleep Study Results: We will contact you with sleep study results and/or next steps after the physician has reviewed your testing.           Qi Herr PA-C  [facility]

## 2024-05-28 NOTE — PATIENT INSTRUCTIONS
I placed an order today for a new home sleep study to reevaluate your sleep apnea.  If the home sleep study shows sleep apnea again, I will order you a CPAP and follow-up with you in 31 to 90 days.  If your home sleep study is inconclusive, I would recommend doing an in lab sleep study.  I also put a referral to weight management.  With weight loss, it is possible to improve and even resolve your obstructive sleep apnea.  They should call you to schedule an appointment.    Nursing Support:  When: Monday through Friday 7A-5PM except holidays  Where: Our direct line is [phone].    If you are having a true emergency please call 911.  In the event that the line is busy or it is after hours please leave a voice message and we will return your call.  Please speak clearly, leaving your full name, birth date, best number to reach you and the reason for your call.   Medication refills: We will need the name of the medication, the dosage, the ordering provider, whether you get a 30 or 90 day refill, and the pharmacy name and address.  Medications will be ordered by the provider only.  Nurses cannot call in prescriptions.  Please allow 7 days for medication refills.  Physician requested updates: If your provider requested that you call with an update after starting medication, please be ready to provide us the medication and dosage, what time you take your medication, the time you attempt to fall asleep, time you fall asleep, when you wake up, and what time you get out of bed.  Sleep Study Results: We will contact you with sleep study results and/or next steps after the physician has reviewed your testing.

## 2024-05-28 NOTE — ASSESSMENT & PLAN NOTE
We discussed that weight loss can be beneficial for overall health and can improve and sometimes even resolve obstructive sleep apnea.  I placed an order today for a consult with weight management.  Patient feels motivated to try to lose weight.

## 2024-06-03 ENCOUNTER — TELEPHONE (OUTPATIENT)
Dept: FAMILY MEDICINE CLINIC | Facility: CLINIC | Age: 49
End: 2024-06-03

## 2024-06-03 ENCOUNTER — OFFICE VISIT (OUTPATIENT)
Dept: FAMILY MEDICINE CLINIC | Facility: CLINIC | Age: 49
End: 2024-06-03
Payer: COMMERCIAL

## 2024-06-03 ENCOUNTER — HOSPITAL ENCOUNTER (OUTPATIENT)
Dept: RADIOLOGY | Facility: HOSPITAL | Age: 49
Discharge: HOME | End: 2024-06-03
Payer: COMMERCIAL

## 2024-06-03 VITALS
BODY MASS INDEX: 30.66 KG/M2 | OXYGEN SATURATION: 99 % | HEIGHT: 65 IN | TEMPERATURE: 98.2 F | WEIGHT: 184 LBS | SYSTOLIC BLOOD PRESSURE: 114 MMHG | HEART RATE: 84 BPM | DIASTOLIC BLOOD PRESSURE: 72 MMHG

## 2024-06-03 DIAGNOSIS — T30.0 BURN: Primary | ICD-10-CM

## 2024-06-03 DIAGNOSIS — J20.9 ACUTE BRONCHITIS, UNSPECIFIED ORGANISM: Primary | ICD-10-CM

## 2024-06-03 DIAGNOSIS — J20.9 ACUTE BRONCHITIS, UNSPECIFIED ORGANISM: ICD-10-CM

## 2024-06-03 DIAGNOSIS — M05.9 SEROPOSITIVE RHEUMATOID ARTHRITIS (HCC): ICD-10-CM

## 2024-06-03 PROCEDURE — 99214 OFFICE O/P EST MOD 30 MIN: CPT | Performed by: INTERNAL MEDICINE

## 2024-06-03 PROCEDURE — 71046 X-RAY EXAM CHEST 2 VIEWS: CPT

## 2024-06-03 RX ORDER — BUDESONIDE AND FORMOTEROL FUMARATE DIHYDRATE 160; 4.5 UG/1; UG/1
2 AEROSOL RESPIRATORY (INHALATION) 2 TIMES DAILY
Qty: 10.2 G | Refills: 5 | Status: SHIPPED | OUTPATIENT
Start: 2024-06-03

## 2024-06-03 RX ORDER — BENZONATATE 200 MG/1
200 CAPSULE ORAL 3 TIMES DAILY PRN
Qty: 20 CAPSULE | Refills: 0 | Status: SHIPPED | OUTPATIENT
Start: 2024-06-03

## 2024-06-03 RX ORDER — AZITHROMYCIN 250 MG/1
TABLET, FILM COATED ORAL
Qty: 6 TABLET | Refills: 0 | Status: SHIPPED | OUTPATIENT
Start: 2024-06-03 | End: 2024-06-07

## 2024-06-03 RX ORDER — CODEINE PHOSPHATE/GUAIFENESIN 10-100MG/5
5 LIQUID (ML) ORAL 3 TIMES DAILY PRN
Qty: 118 ML | Refills: 0 | Status: SHIPPED | OUTPATIENT
Start: 2024-06-03

## 2024-06-03 NOTE — TELEPHONE ENCOUNTER
At check out, pt was asking about a lotion for her burn. Pt asked if she could have something sent to the pharmacy for that.

## 2024-06-03 NOTE — PROGRESS NOTES
"Assessment/Plan:           Problem List Items Addressed This Visit       Seropositive rheumatoid arthritis (HCC)     Other Visit Diagnoses       Acute bronchitis, unspecified organism    -  Primary    Relevant Medications    azithromycin (ZITHROMAX) 250 mg tablet    benzonatate (TESSALON) 200 MG capsule    guaifenesin-codeine (GUAIFENESIN AC) 100-10 MG/5ML liquid    budesonide-formoterol (SYMBICORT) 160-4.5 mcg/act inhaler    Other Relevant Orders    XR chest pa & lateral         A stat chest x-ray will be ordered.  Patient will be started on antibiotic cough medication and inhaler.  She will keep me posted.  I was advised to consult with her rheumatologist to hold Arava    Subjective:      Patient ID: Bella Almaguer is a 48 y.o. female.    HPI  Patient with history of allergies and asthma is here to follow-up on upper respiratory infection started about a week ago.  She thought it was allergies however over the last couple of days she has been having more hoarseness of voice chest tightness, coughing which is slightly productive.  She had a low-grade temperature.  She has albuterol inhaler which she has been using.  She did take a COVID shot and has been vaccinated for flu.    She is on Arava for rheumatoid arthritis.  The following portions of the patient's history were reviewed and updated as appropriate: allergies, current medications, past medical history, past social history, past surgical history, and problem list.    Review of Systems      Objective:      /72 (BP Location: Right arm, Patient Position: Sitting, Cuff Size: Standard)   Pulse 84   Temp 98.2 °F (36.8 °C) (Temporal)   Ht 5' 5\" (1.651 m)   Wt 83.5 kg (184 lb)   SpO2 99%   BMI 30.62 kg/m²          Physical Exam  Constitutional:       Comments: Sounds hoarse   Cardiovascular:      Rate and Rhythm: Normal rate and regular rhythm.   Pulmonary:      Effort: Pulmonary effort is normal. No respiratory distress.      Breath sounds: Normal " breath sounds. No wheezing or rales.   Neurological:      Mental Status: She is alert.

## 2024-06-05 ENCOUNTER — NEW PATIENT COMPREHENSIVE (OUTPATIENT)
Dept: URBAN - METROPOLITAN AREA CLINIC 6 | Facility: CLINIC | Age: 49
End: 2024-06-05

## 2024-06-05 DIAGNOSIS — H04.123: ICD-10-CM

## 2024-06-05 DIAGNOSIS — M06.9: ICD-10-CM

## 2024-06-05 PROCEDURE — 92004 COMPRE OPH EXAM NEW PT 1/>: CPT

## 2024-06-05 ASSESSMENT — VISUAL ACUITY
OD_SC: 20/30
OS_SC: 20/30

## 2024-06-05 ASSESSMENT — TONOMETRY
OD_IOP_MMHG: 11
OS_IOP_MMHG: 12

## 2024-06-13 DIAGNOSIS — G47.33 OSA (OBSTRUCTIVE SLEEP APNEA): Primary | ICD-10-CM

## 2024-06-20 ENCOUNTER — HOSPITAL ENCOUNTER (OUTPATIENT)
Dept: SLEEP CENTER | Facility: CLINIC | Age: 49
Discharge: HOME | End: 2024-06-20
Payer: COMMERCIAL

## 2024-06-20 DIAGNOSIS — G47.33 OSA (OBSTRUCTIVE SLEEP APNEA): ICD-10-CM

## 2024-06-20 PROCEDURE — 95810 POLYSOM 6/> YRS 4/> PARAM: CPT

## 2024-06-20 PROCEDURE — 95810 POLYSOM 6/> YRS 4/> PARAM: CPT | Performed by: PSYCHIATRY & NEUROLOGY

## 2024-06-21 NOTE — PROGRESS NOTES
Sleep Study Documentation    Pre-Sleep Study       Sleep testing procedure explained to patient:YES    Patient napped prior to study:YES- less than 30 minutes. Napped after 2PM: yes    Caffeine:Dayshift worker after 12PM.  Caffeine use:YES- coffee  6 ounces    Alcohol:Dayshift workers after 5PM: Alcohol use:NO    Typical day for patient:YES       Study Documentation    Sleep Study Indications: EDS    Sleep Study: Diagnostic   Snore:Severe  Supplemental O2: no    Minimum SaO2 75  Baseline SaO2 98      EKG abnormalities: no     EEG abnormalities: no    Were abnormal behaviors in sleep observed:NO    Is Total Sleep Study Recording Time < 2 hours: N/A    Is Total Sleep Study Recording Time > 2 hours but study is incomplete: N/A    Is Total Sleep Study Recording Time 6 hours or more but sleep was not obtained: NO    Patient classification: employed       Post-Sleep Study    Medication used at bedtime or during sleep study:NO    Patient reports time it took to fall asleep:30 to 60 minutes    Patient reports waking up during study:1 to 2 times.  Patient reports returning to sleep in greater than 30 minutes.    Patient reports sleeping 6 to 8 hours without dreaming.    Does the Patient feel this is a typical night of sleep:better than usual    Patient rated sleepiness: Very sleepy or tired    PAP treatment:no.

## 2024-06-27 ENCOUNTER — OFFICE VISIT (OUTPATIENT)
Dept: BARIATRICS | Facility: CLINIC | Age: 49
End: 2024-06-27
Payer: COMMERCIAL

## 2024-06-27 VITALS
HEIGHT: 64 IN | HEART RATE: 83 BPM | BODY MASS INDEX: 31.45 KG/M2 | DIASTOLIC BLOOD PRESSURE: 90 MMHG | WEIGHT: 184.2 LBS | SYSTOLIC BLOOD PRESSURE: 130 MMHG

## 2024-06-27 DIAGNOSIS — E66.9 OBESITY, CLASS I, BMI 30-34.9: Primary | ICD-10-CM

## 2024-06-27 DIAGNOSIS — M05.9 SEROPOSITIVE RHEUMATOID ARTHRITIS (HCC): ICD-10-CM

## 2024-06-27 DIAGNOSIS — G47.33 OSA (OBSTRUCTIVE SLEEP APNEA): ICD-10-CM

## 2024-06-27 PROBLEM — E66.811 OBESITY, CLASS I, BMI 30-34.9: Status: ACTIVE | Noted: 2024-06-27

## 2024-06-27 PROCEDURE — 99204 OFFICE O/P NEW MOD 45 MIN: CPT | Performed by: INTERNAL MEDICINE

## 2024-06-27 NOTE — ASSESSMENT & PLAN NOTE
Nutrition: Reviewed diet recall with the patient and recommend starting the day with a protein rich breakfast and avoid skipping meals.  Could consider protein supplements such as protein shakes  -Advised patient to monitor her carbohydrate intake and incorporate more whole foods such as veggies and fruits  -Also suggested patient work with the dietitian to obtain a nutrition prescription calorie goal and protein range      Physical Activity: Given her resources for the [employer] fitness program; patient advised to start with brisk walking 20-30 minutes a day 3 to 4 days a week    Sleep: -Awaiting results of the sleep study; poor quality sleep and duration for 4 to 5 hours which also could be a contributor to her weight    Behavioral/Stress: Recommended patient start tracking current intake using MFP, so adjustments can be made by the dietitian; some of her cravings could be addressed by adequate protein and fiber intake with meals and snacks which could also help reduce her portions    Antiobesity Medications/Medical --class I obesity BMI of 32 with hyperlipidemia and rule out sleep apnea  -Discussed various medication options with the patient.  -Discussed injectable medications such as Zepbound and Wegovy and informed her about supply concerns regarding these agents  -Discussed oral medication options:  -Metformin was discussed as a medicine that we use off label for weight loss prediabetes and insulin resistance  -Discussed Qsymia; patient's blood pressure 130/90.  -Discussed Topamax; no contraindications  -Discussed Contrave and its components Wellbutrin and naltrexone.  No contraindications identified  -Discussed phentermine as a controlled substance and the need to obtain baseline EKG and routine monitoring of blood pressure and heart rate while on this agent; would prefer to use stimulants as the last option  -Discussed that any of the oral medications could be used in combination  -patient is going to call us  back with her decision regarding dietitian and would also like to take time considering all the medication options provided

## 2024-06-27 NOTE — PROGRESS NOTES
Assessment/Plan     Bella Almaguer is 48 y.o. year old female  who comes in for consultation for assistance with weight management.     - Discussed options of [facility]-Intensive Lifestyle Intervention Program, Very Low Calorie Diet-VLCD, and Conservative Program and the role of weight loss medications.  - Patient is interested in pursuing Conservative Program;     Obesity, Class I, BMI 30-34.9  Nutrition: Reviewed diet recall with the patient and recommend starting the day with a protein rich breakfast and avoid skipping meals.  Could consider protein supplements such as protein shakes  -Advised patient to monitor her carbohydrate intake and incorporate more whole foods such as veggies and fruits  -Also suggested patient work with the dietitian to obtain a nutrition prescription calorie goal and protein range      Physical Activity: Given her resources for the [employer] fitness program; patient advised to start with brisk walking 20-30 minutes a day 3 to 4 days a week    Sleep: -Awaiting results of the sleep study; poor quality sleep and duration for 4 to 5 hours which also could be a contributor to her weight    Behavioral/Stress: Recommended patient start tracking current intake using MFP, so adjustments can be made by the dietitian; some of her cravings could be addressed by adequate protein and fiber intake with meals and snacks which could also help reduce her portions    Antiobesity Medications/Medical --class I obesity BMI of 32 with hyperlipidemia and rule out sleep apnea  -Discussed various medication options with the patient.  -Discussed injectable medications such as Zepbound and Wegovy and informed her about supply concerns regarding these agents  -Discussed oral medication options:  -Metformin was discussed as a medicine that we use off label for weight loss prediabetes and insulin resistance  -Discussed Qsymia; patient's blood pressure 130/90.  -Discussed Topamax; no contraindications  -Discussed  Contrave and its components Wellbutrin and naltrexone.  No contraindications identified  -Discussed phentermine as a controlled substance and the need to obtain baseline EKG and routine monitoring of blood pressure and heart rate while on this agent; would prefer to use stimulants as the last option  -Discussed that any of the oral medications could be used in combination  -patient is going to call us back with her decision regarding dietitian and would also like to take time considering all the medication options provided     Bella was seen today for consult.    Diagnoses and all orders for this visit:    Obesity, Class I, BMI 30-34.9  -     Hemoglobin A1C; Future  -     Insulin, fasting; Future  -     Vitamin D 25 hydroxy; Future    CHRISTOPHER (obstructive sleep apnea)  -     Ambulatory Referral to Weight Management    BMI 30.0-30.9,adult  -     Ambulatory Referral to Weight Management    Seropositive rheumatoid arthritis (HCC)          -In addition, please follow general recommendations below.          Return visit:  6-8 weeks        General Lifestyle recommendations:    Nutrition   -Avoid skipping meals. Avoid sugary beverages. At least 64oz of water daily.  Limit processed food, refined sugars and grain. Encourage  healthy choices for meals and snacks   -Focus on protein goals and non starchy fiber rich vegetables for satiety effect and to help support a calorie deficit.   - Emphasize portion control, well balanced macronutrient's (protein, carbohydrate, fat using MyPlate method )and adequate protein with each meal/snacks and distributing calories equally throughout the day along with.   -Advise starting the day with a protein breakfast   Behavioral/Stress   Food log via nisha or provided paper log (nisha options include www.DoublePositivepal.com, sparkpeople.com, loseit.com, calorieking.com, myfab5). Encouraged mindful eating. Be sure to set aside time to eat, eat slowly, and savor your food. Consider meditation apps  "and/or taking a few minutes of mindfulness every AM. Understand the role of regarding the role of stress hormone cortisol in promoting weight gain and visceral fat accumulation. Weigh daily or atleast 2-3 times/ week  Physical Activity   Increase physical activity by 10 minutes daily. Gradually increase physical activity to a goal of 5 days per week for 30 minutes of MODERATE intensity ( should be able to pass the \"talk test\" but should not be able to sing. Target 150-300 minutes  PLUS 2 days per week of FULL BODY resistance training. Progression will be addressed at follow up visits. Encouraged contemplation regarding establishing a daily physical activity routine  - Resistance training along with increase protein intake is important to maintain and enhance metabolism  Sleep   Encourage sleep hygiene and importance of having adequate sleep duration at least > 6 hours to support response in weight loss efforts    Handouts provided :  THRIVE program at [facility]  MyPlate and food quality  Food log resources, phone nisha or paper journal  Antiobesity medications options     - Discussed at length and the role of weight loss medications and medication options   - Explained the importance of making lifestyle changes in addition to starting any anti-obesity medications if the  patient chooses.  -  Initial weight loss goal of 5-10% weight loss for improved health  - Weight loss can improve patient's co-morbid conditions and/or prevent weight-related complications.  - Weight is not at goal and patient has been unable to achieve a meaningful weight loss above 5% using various programs and tools for more than 6 months    Bella was seen today for consult.    Diagnoses and all orders for this visit:    Obesity, Class I, BMI 30-34.9  -     Hemoglobin A1C; Future  -     Insulin, fasting; Future  -     Vitamin D 25 hydroxy; Future    CHRISTOPHER (obstructive sleep apnea)  -     Ambulatory Referral to Weight Management    BMI " "30.0-30.9,adult  -     Ambulatory Referral to Weight Management    Seropositive rheumatoid arthritis (HCC)                      Total time spent reviewing chart, interviewing patient, examining patient, discussing plan, answering all questions, and documentin min, with >50% face-to-face time spent counseling patient on nonsurgical interventions for the treatment of excess weight. Discussed in detail nonsurgical options including intensive lifestyle intervention program, very low-calorie diet program and conservative program.  Discussed the role of weight loss medications.  Counseled patient on diet behavior and exercise modification for weight loss.            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[place] [employer]    Psycho social-lives with     Gyneac (Menopausal status/periods/contraception)-irregular tubal ligation        Start date: 2024  Intial weight : 184 lbs  Intial BMI: 32 kg/m2  Obesity Class: 30.0-34.9- Obesity Class I  Goal weight: 125 lbs      Colonoscopy: 7 years ago  Mammogram: UTD    History of present illness       Onset--7 years ago was 125 pounds, history of rheumatoid arthritis on courses of steroids, afraid to do exercise because everything hurts was on Xeljanz but does not like the current medication that she is taking    Fam hx of obesity-  mom    Food behaviors\"head\" hunger/cravings and struggle controlling portions    Previous Weight loss " medications/Weight loss attempts:   3 to 4 years ago diagnosed with mild sleep apnea and was advised to lose weight tried walking and change diet, she lost a lot of weight but started to regain 20 lbs kept it off for a year  Patient reports and other history-    Referred by sleep medicine  Wt Readings from Last 20 Encounters:   06/27/24 83.6 kg (184 lb 3.2 oz)   06/03/24 83.5 kg (184 lb)   05/28/24 83.9 kg (185 lb)   04/10/24 82.6 kg (182 lb)   03/22/24 82.1 kg (181 lb)   03/22/24 83 kg (183 lb)   03/07/24 81.2 kg (179 lb)   02/22/24 80.7 kg (178 lb)   05/28/13 68.6 kg (151 lb 4.9 oz)   05/22/13 68 kg (150 lb)   03/08/13 67.3 kg (148 lb 4.9 oz)   12/05/12 69 kg (152 lb 2.1 oz)           Medication considerations/contraindications     -Patient denies personal history of pancreatitis. Patient also denies personal and family history of medullary thyroid cancer and multiple endocrine neoplasia type 2 (MEN 2 tumor). -Patient denies any history of kidney stones, seizures, or glaucoma, diabetic retinopathy, gall bladder disease, hyperthyroidism.  -Denies Hx of CAD, PAD, palpitations, arrhythmia.   -Denies uncontrolled anxiety or depression, suicidal behavior or thinking , insomnia or sleep disturbance+ being evaluated for sleep apnea.         Past medical history/past surgical history       Previous notes and records have been reviewed.    The following portions of the patient's history were reviewed and updated as appropriate: allergies, current medications, past family history, past medical history, past social history, past surgical history, and problem list.    Past Medical History:   Diagnosis Date    Simple endometrial hyperplasia without atypia          Past Surgical History:   Procedure Laterality Date    DILATION AND CURETTAGE OF UTERUS WITH HYSTEROSOCPY      x2, with polpectomy    OVARIAN CYST REMOVAL               Family History   Problem Relation Age of Onset    Hypertension Mother     Hypertension Father      "Heart attack Half-Brother     No Known Problems Daughter     No Known Problems Son     Breast cancer Maternal Grandmother     Lung disease Paternal Grandmother     Rheum arthritis Paternal Grandmother     Osteoporosis Paternal Grandmother     Colon cancer Paternal Aunt     Ovarian cancer Paternal Aunt             Objective     /90 (BP Location: Left arm, Patient Position: Sitting, Cuff Size: Large)   Pulse 83   Ht 5' 3.5\" (1.613 m)   Wt 83.6 kg (184 lb 3.2 oz)   LMP 06/06/2024 (Approximate)   BMI 32.12 kg/m²       Review of Systems   Constitutional:  Negative for fatigue.   HENT:  Negative for sore throat.    Respiratory:  Negative for cough and shortness of breath.    Cardiovascular:  Negative for chest pain, palpitations and leg swelling.   Gastrointestinal:  Negative for abdominal pain, constipation, diarrhea and nausea.   Genitourinary:  Negative for dysuria.   Musculoskeletal:  Negative for arthralgias and back pain.   Skin:  Negative for rash.   Neurological:  Negative for headaches.   Psychiatric/Behavioral:  Negative for dysphoric mood. The patient is not nervous/anxious.        Physical Exam  Vitals and nursing note reviewed.   Constitutional:       Appearance: Normal appearance.   HENT:      Head: Normocephalic.   Neck:      Thyroid: No thyroid mass, thyromegaly or thyroid tenderness.   Cardiovascular:      Rate and Rhythm: Normal rate and regular rhythm.      Pulses: Normal pulses.      Heart sounds: Normal heart sounds.   Pulmonary:      Effort: Pulmonary effort is normal.      Breath sounds: Normal breath sounds.   Abdominal:      General: Bowel sounds are normal.      Palpations: Abdomen is soft.   Musculoskeletal:      Cervical back: Normal range of motion and neck supple. No tenderness.      Right lower leg: No edema.      Left lower leg: No edema.   Skin:     General: Skin is warm and dry.   Neurological:      General: No focal deficit present.      Mental Status: She is alert and " oriented to person, place, and time.   Psychiatric:         Mood and Affect: Mood normal.         Behavior: Behavior normal.         Thought Content: Thought content normal.         Judgment: Judgment normal.            Medications       Current Outpatient Medications:     ACETAMINOPHEN 8 HOUR PO, Take 500 mg by mouth every 6 (six) hours as needed, Disp: , Rfl:     albuterol (PROVENTIL HFA,VENTOLIN HFA) 90 mcg/act inhaler, Inhale 2 puffs every 6 (six) hours as needed for wheezing, Disp: , Rfl:     amitriptyline (ELAVIL) 25 mg tablet, Take by mouth, Disp: , Rfl:     budesonide-formoterol (SYMBICORT) 160-4.5 mcg/act inhaler, Inhale 2 puffs 2 (two) times a day Rinse mouth after use., Disp: 10.2 g, Rfl: 5    Cholecalciferol 50 MCG (2000 UT) CAPS, Take 1 capsule by mouth every morning, Disp: , Rfl:     cycloSPORINE (Restasis) 0.05 % ophthalmic emulsion, Administer 1 drop to both eyes 2 (two) times a day, Disp: , Rfl:     diclofenac (VOLTAREN) 75 mg EC tablet, Take 1 tablet (75 mg total) by mouth 2 (two) times a day As needed for joint pain, take with food, Disp: 60 tablet, Rfl: 6    ergocalciferol (VITAMIN D2) 50,000 units, Take 1 capsule (50,000 Units total) by mouth once a week, Disp: 8 capsule, Rfl: 0    fluticasone (FLONASE) 50 mcg/act nasal spray, 2 sprays into each nostril daily, Disp: , Rfl:     ibuprofen (MOTRIN) 200 mg tablet, Take 200 mg by mouth every 6 (six) hours as needed, Disp: , Rfl:     leflunomide (ARAVA) 10 MG tablet, Take 1 tablet (10 mg total) by mouth daily, Disp: 30 tablet, Rfl: 6    levocetirizine (XYZAL) 5 MG tablet, Take 5 mg by mouth, Disp: , Rfl:     loratadine (CLARITIN) 10 mg tablet, Take 1 tablet by mouth daily, Disp: , Rfl:     MAGNESIUM CITRATE PO, Take 400 mg by mouth Takes once daily, Disp: , Rfl:     triamcinolone (KENALOG) 0.1 % ointment, Apply 1 application. topically 2 (two) times a day, Disp: , Rfl:     benzonatate (TESSALON) 200 MG capsule, Take 1 capsule (200 mg total) by mouth 3  (three) times a day as needed for cough (Patient not taking: Reported on 6/27/2024), Disp: 20 capsule, Rfl: 0    guaifenesin-codeine (GUAIFENESIN AC) 100-10 MG/5ML liquid, Take 5 mL by mouth 3 (three) times a day as needed for cough (Patient not taking: Reported on 6/27/2024), Disp: 118 mL, Rfl: 0    meclizine (ANTIVERT) 25 mg tablet, Take by mouth (Patient not taking: Reported on 6/27/2024), Disp: , Rfl:     predniSONE 5 mg tablet, 4 tabs x7 days, then 3 tabs x7 days, then 2 tabs x7 days, then 1 tab x7 days, then stop. (Patient not taking: Reported on 6/27/2024), Disp: 70 tablet, Rfl: 0    rizatriptan (MAXALT-MLT) 10 mg disintegrating tablet, Take 10 mg by mouth (Patient not taking: Reported on 2/22/2024), Disp: , Rfl:     silver sulfadiazine (Silvadene) 1 % cream, Apply topically daily (Patient not taking: Reported on 6/27/2024), Disp: 50 g, Rfl: 0    Vitamin D, Ergocalciferol, 28342 units CAPS, Take 1 capsule by mouth once a week (Patient not taking: Reported on 6/27/2024), Disp: , Rfl:            Labs and imaging     Recent labs and imaging have been personally reviewed.  Lab Results   Component Value Date    WBC 6.23 03/23/2024    HGB 12.0 03/23/2024    HCT 36.5 03/23/2024    MCV 89 03/23/2024     03/23/2024     Lab Results   Component Value Date    SODIUM 141 03/23/2024    K 3.8 03/23/2024     03/23/2024    CO2 27 03/23/2024    AGAP 8 03/23/2024    BUN 13 03/23/2024    CREATININE 0.67 03/23/2024    GLUC 96 06/01/2023    GLUF 85 03/23/2024    CALCIUM 8.8 03/23/2024    AST 35 03/23/2024    ALT 41 03/23/2024    ALKPHOS 79 03/23/2024    TP 7.2 03/23/2024    TBILI 0.46 03/23/2024    EGFR 103 03/23/2024     Lab Results   Component Value Date    HGBA1C 5.4 04/18/2022     Lab Results   Component Value Date    GKE1CJCMUBIX 3.293 03/06/2024    TSH 3.41 12/11/2021     Lab Results   Component Value Date    CHOLESTEROL 266 (H) 03/23/2024     Lab Results   Component Value Date    HDL 65 03/23/2024     Lab  Results   Component Value Date    TRIG 123 03/23/2024     Lab Results   Component Value Date    LDLCALC 176 (H) 03/23/2024

## 2024-07-01 DIAGNOSIS — G47.33 OSA (OBSTRUCTIVE SLEEP APNEA): Primary | ICD-10-CM

## 2024-07-01 DIAGNOSIS — G47.19 EXCESSIVE DAYTIME SLEEPINESS: ICD-10-CM

## 2024-07-16 ENCOUNTER — TELEPHONE (OUTPATIENT)
Dept: SLEEP CENTER | Facility: CLINIC | Age: 49
End: 2024-07-16

## 2024-07-16 ENCOUNTER — OFFICE VISIT (OUTPATIENT)
Dept: FAMILY MEDICINE CLINIC | Facility: CLINIC | Age: 49
End: 2024-07-16
Payer: COMMERCIAL

## 2024-07-16 VITALS
WEIGHT: 184.4 LBS | HEART RATE: 72 BPM | TEMPERATURE: 97.8 F | BODY MASS INDEX: 31.48 KG/M2 | DIASTOLIC BLOOD PRESSURE: 80 MMHG | HEIGHT: 64 IN | OXYGEN SATURATION: 99 % | SYSTOLIC BLOOD PRESSURE: 118 MMHG | RESPIRATION RATE: 16 BRPM

## 2024-07-16 DIAGNOSIS — R00.2 PALPITATIONS: ICD-10-CM

## 2024-07-16 DIAGNOSIS — R06.09 DOE (DYSPNEA ON EXERTION): ICD-10-CM

## 2024-07-16 DIAGNOSIS — M05.9 SEROPOSITIVE RHEUMATOID ARTHRITIS (HCC): Primary | ICD-10-CM

## 2024-07-16 PROCEDURE — 99214 OFFICE O/P EST MOD 30 MIN: CPT | Performed by: FAMILY MEDICINE

## 2024-07-16 NOTE — TELEPHONE ENCOUNTER
----- Message from Qi Herr PA-C sent at 7/1/2024  1:53 PM EDT -----  Mild sleep apnea, AHI of 12.5 worsening to moderate in the supine position and severe in REM sleep. Auto CPAP recommended and ordered. Schedule follow up 31-90 days after beginning treatment.

## 2024-07-16 NOTE — TELEPHONE ENCOUNTER
Called patient and left message advising I will send a Verdiemhart message with the sleep study results and next steps.  Provided sleep center number([phone]) to call if any questions.

## 2024-07-19 ENCOUNTER — HOSPITAL ENCOUNTER (OUTPATIENT)
Dept: NON INVASIVE DIAGNOSTICS | Facility: CLINIC | Age: 49
Discharge: HOME | End: 2024-07-19
Payer: COMMERCIAL

## 2024-07-19 DIAGNOSIS — R06.09 DOE (DYSPNEA ON EXERTION): ICD-10-CM

## 2024-07-19 DIAGNOSIS — R00.2 PALPITATIONS: ICD-10-CM

## 2024-07-19 PROCEDURE — 93225 XTRNL ECG REC<48 HRS REC: CPT

## 2024-07-19 PROCEDURE — 93226 XTRNL ECG REC<48 HR SCAN A/R: CPT

## 2024-07-19 NOTE — PROGRESS NOTES
Assessment/Plan:    47 y/o woman with: CASTRO and palpitations in the setting of pt with RA. Will continue meds. And check stress echo and holter monitor will refer to cardiology. Continue meds. Discussed supportive care and return parameters.     No problem-specific Assessment & Plan notes found for this encounter.       Diagnoses and all orders for this visit:    Seropositive rheumatoid arthritis (HCC)    Palpitations  -     Holter monitor; Future  -     Echo stress test, exercise; Future  -     Ambulatory Referral to Cardiology; Future    CASTRO (dyspnea on exertion)  -     Holter monitor; Future  -     Echo stress test, exercise; Future  -     Ambulatory Referral to Cardiology; Future          Subjective:     Chief Complaint   Patient presents with    Hypertension     X3 days    Palpitations     X1 week    Fatigue    Leg Pain     Bilateral- burning, numbness        Patient ID: Bella Almaguer is a 48 y.o. female.    Patient is a 47 y/o woman who presents for follow-up on CASTRO and palpitations in the setting of pt with RA. No fevers chills nausea or vomiting no CP.    Hypertension  Associated symptoms include palpitations and shortness of breath.   Palpitations  Associated symptoms include fatigue.   Fatigue  Associated symptoms include fatigue.   Leg Pain         The following portions of the patient's history were reviewed and updated as appropriate: allergies, current medications, past family history, past medical history, past social history, past surgical history and problem list.    Review of Systems   Constitutional:  Positive for fatigue.   HENT: Negative.     Eyes: Negative.    Respiratory:  Positive for shortness of breath.    Cardiovascular:  Positive for palpitations.   Gastrointestinal: Negative.    Endocrine: Negative.    Genitourinary: Negative.    Musculoskeletal: Negative.    Allergic/Immunologic: Negative.    Neurological: Negative.    Hematological: Negative.    Psychiatric/Behavioral: Negative.    "  All other systems reviewed and are negative.        Objective:      /80 (BP Location: Left arm, Patient Position: Sitting, Cuff Size: Large)   Pulse 72   Temp 97.8 °F (36.6 °C) (Tympanic)   Resp 16   Ht 5' 3.5\" (1.613 m)   Wt 83.6 kg (184 lb 6.4 oz)   LMP 06/06/2024 (Approximate)   SpO2 99%   BMI 32.15 kg/m²          Physical Exam  Constitutional:       Appearance: She is well-developed.   HENT:      Head: Atraumatic.      Right Ear: External ear normal.      Left Ear: External ear normal.   Eyes:      Extraocular Movements: EOM normal.      Conjunctiva/sclera: Conjunctivae normal.      Pupils: Pupils are equal, round, and reactive to light.   Cardiovascular:      Rate and Rhythm: Normal rate and regular rhythm.      Heart sounds: Normal heart sounds.   Pulmonary:      Effort: Pulmonary effort is normal. No respiratory distress.      Breath sounds: Normal breath sounds.   Abdominal:      General: There is no distension.      Palpations: Abdomen is soft.      Tenderness: There is no abdominal tenderness. There is no guarding or rebound.   Musculoskeletal:         General: Normal range of motion.      Cervical back: Normal range of motion.   Skin:     General: Skin is warm and dry.   Neurological:      Mental Status: She is alert and oriented to person, place, and time.      Cranial Nerves: No cranial nerve deficit.   Psychiatric:         Mood and Affect: Mood and affect normal.         Behavior: Behavior normal.         Thought Content: Thought content normal.         Judgment: Judgment normal.         "

## 2024-07-22 ENCOUNTER — HOSPITAL ENCOUNTER (OUTPATIENT)
Dept: NON INVASIVE DIAGNOSTICS | Facility: CLINIC | Age: 49
Discharge: HOME | End: 2024-07-22
Payer: COMMERCIAL

## 2024-07-22 VITALS
BODY MASS INDEX: 31.41 KG/M2 | DIASTOLIC BLOOD PRESSURE: 80 MMHG | SYSTOLIC BLOOD PRESSURE: 138 MMHG | OXYGEN SATURATION: 99 % | HEART RATE: 63 BPM | RESPIRATION RATE: 20 BRPM | HEIGHT: 64 IN | WEIGHT: 184 LBS

## 2024-07-22 DIAGNOSIS — R00.2 PALPITATIONS: ICD-10-CM

## 2024-07-22 DIAGNOSIS — R06.09 DOE (DYSPNEA ON EXERTION): ICD-10-CM

## 2024-07-22 LAB
MAX HR PERCENT: 87 %
MAX HR: 150 BPM
RATE PRESSURE PRODUCT: NORMAL
SL CV LV EF: 60
SL CV STRESS RECOVERY BP: NORMAL MMHG
SL CV STRESS RECOVERY HR: 71 BPM
SL CV STRESS RECOVERY O2 SAT: 100 %
SL CV STRESS STAGE REACHED: 3
STRESS ANGINA INDEX: 0
STRESS BASELINE BP: NORMAL MMHG
STRESS BASELINE HR: 63 BPM
STRESS O2 SAT REST: 99 %
STRESS PEAK HR: 148 BPM
STRESS POST ESTIMATED WORKLOAD: 8 METS
STRESS POST EXERCISE DUR MIN: 6 MIN
STRESS POST EXERCISE DUR SEC: 43 SEC
STRESS POST O2 SAT PEAK: 98 %
STRESS POST PEAK BP: 184 MMHG

## 2024-07-22 PROCEDURE — 93350 STRESS TTE ONLY: CPT | Performed by: INTERNAL MEDICINE

## 2024-07-22 PROCEDURE — 93350 STRESS TTE ONLY: CPT

## 2024-07-25 LAB
ARRHY DURING EX: NORMAL
CHEST PAIN STATEMENT: NORMAL
MAX DIASTOLIC BP: 90 MMHG
MAX PREDICTED HEART RATE: 171 BPM
PROTOCOL NAME: NORMAL
REASON FOR TERMINATION: NORMAL
STRESS POST EXERCISE DUR MIN: 6 MIN
STRESS POST EXERCISE DUR SEC: 43 SEC
STRESS POST PEAK HR: 150 BPM
STRESS POST PEAK SYSTOLIC BP: 184 MMHG
TARGET HR FORMULA: NORMAL
TEST INDICATION: NORMAL

## 2024-07-25 PROCEDURE — 93227 XTRNL ECG REC<48 HR R&I: CPT | Performed by: STUDENT IN AN ORGANIZED HEALTH CARE EDUCATION/TRAINING PROGRAM

## (undated) RX ORDER — CYCLOSPORINE 0.5 MG/ML
1 EMULSION OPHTHALMIC TWICE A DAY
Start: 2024-06-05